# Patient Record
Sex: FEMALE | Race: WHITE | NOT HISPANIC OR LATINO | Employment: OTHER | URBAN - METROPOLITAN AREA
[De-identification: names, ages, dates, MRNs, and addresses within clinical notes are randomized per-mention and may not be internally consistent; named-entity substitution may affect disease eponyms.]

---

## 2023-04-13 PROBLEM — M54.50 ACUTE LEFT-SIDED LOW BACK PAIN WITHOUT SCIATICA: Status: ACTIVE | Noted: 2023-04-13

## 2023-04-13 PROBLEM — K21.9 GERD (GASTROESOPHAGEAL REFLUX DISEASE): Status: ACTIVE | Noted: 2023-04-13

## 2023-04-13 PROBLEM — E78.00 HYPERCHOLESTEROLEMIA: Status: ACTIVE | Noted: 2023-04-13

## 2023-04-13 PROBLEM — N18.31 CHRONIC RENAL FAILURE, STAGE 3A (MULTI): Status: ACTIVE | Noted: 2023-04-13

## 2023-04-13 PROBLEM — R79.89 LFT ELEVATION: Status: ACTIVE | Noted: 2023-04-13

## 2023-04-13 PROBLEM — R32 URINARY INCONTINENCE: Status: ACTIVE | Noted: 2023-04-13

## 2023-04-13 PROBLEM — R74.8 ELEVATED LIVER ENZYMES: Status: ACTIVE | Noted: 2023-04-13

## 2023-04-13 PROBLEM — M54.41 ACUTE MIDLINE LOW BACK PAIN WITH RIGHT-SIDED SCIATICA: Status: ACTIVE | Noted: 2023-04-13

## 2023-04-13 PROBLEM — F10.20 ALCOHOL DEPENDENCE, CONTINUOUS DRINKING BEHAVIOR (MULTI): Status: ACTIVE | Noted: 2023-04-13

## 2023-04-13 PROBLEM — F32.1 CURRENT MODERATE EPISODE OF MAJOR DEPRESSIVE DISORDER (MULTI): Status: ACTIVE | Noted: 2023-04-13

## 2023-04-13 PROBLEM — I10 HYPERTENSION: Status: ACTIVE | Noted: 2023-04-13

## 2023-04-13 PROBLEM — R74.01 ELEVATED ALT MEASUREMENT: Status: ACTIVE | Noted: 2023-04-13

## 2023-04-13 PROBLEM — M81.0 OSTEOPOROSIS: Status: ACTIVE | Noted: 2023-04-13

## 2023-04-13 PROBLEM — H52.13 MYOPIA OF BOTH EYES: Status: ACTIVE | Noted: 2023-04-13

## 2023-04-13 PROBLEM — R06.02 SHORTNESS OF BREATH ON EXERTION: Status: ACTIVE | Noted: 2023-04-13

## 2023-04-13 PROBLEM — J30.9 ALLERGIC RHINITIS: Status: ACTIVE | Noted: 2023-04-13

## 2023-04-13 PROBLEM — L40.9 PSORIASIS: Status: ACTIVE | Noted: 2023-04-13

## 2023-04-25 RX ORDER — KETOROLAC TROMETHAMINE 5 MG/ML
1 SOLUTION OPHTHALMIC
COMMUNITY
Start: 2022-09-28

## 2023-04-25 RX ORDER — METOPROLOL SUCCINATE 100 MG/1
100 TABLET, EXTENDED RELEASE ORAL DAILY
COMMUNITY
End: 2023-04-26 | Stop reason: SDUPTHER

## 2023-04-25 RX ORDER — ESCITALOPRAM OXALATE 10 MG/1
10 TABLET ORAL DAILY
COMMUNITY
End: 2023-04-26 | Stop reason: SDUPTHER

## 2023-04-25 RX ORDER — OXYBUTYNIN CHLORIDE 15 MG/1
15 TABLET, EXTENDED RELEASE ORAL DAILY
COMMUNITY
End: 2023-04-26 | Stop reason: SDUPTHER

## 2023-04-25 RX ORDER — MULTIVITAMIN
TABLET ORAL
COMMUNITY

## 2023-04-25 RX ORDER — CHLORHEXIDINE GLUCONATE ORAL RINSE 1.2 MG/ML
SOLUTION DENTAL
COMMUNITY
Start: 2022-06-27 | End: 2023-04-26 | Stop reason: ALTCHOICE

## 2023-04-25 RX ORDER — DENOSUMAB 60 MG/ML
60 INJECTION SUBCUTANEOUS
COMMUNITY
Start: 2019-09-24

## 2023-04-25 RX ORDER — ALUMINUM ZIRCONIUM OCTACHLOROHYDREX GLY 16 G/100G
GEL TOPICAL
COMMUNITY

## 2023-04-25 RX ORDER — NAPROXEN SODIUM 220 MG/1
81 TABLET, FILM COATED ORAL DAILY
COMMUNITY
Start: 2017-11-17

## 2023-04-25 RX ORDER — ATORVASTATIN CALCIUM 10 MG/1
10 TABLET, FILM COATED ORAL DAILY
COMMUNITY
End: 2023-04-26 | Stop reason: SDUPTHER

## 2023-04-25 RX ORDER — CLOBETASOL PROPIONATE 0.05 G/ML
SPRAY TOPICAL 2 TIMES DAILY
COMMUNITY
Start: 2012-05-16

## 2023-04-25 RX ORDER — ASCORBATE CALCIUM 500 MG
TABLET ORAL
COMMUNITY

## 2023-04-25 RX ORDER — PYRIDOXINE HCL (VITAMIN B6) 100 MG
100 TABLET ORAL
COMMUNITY

## 2023-04-25 RX ORDER — HALOBETASOL PROPIONATE 0.5 MG/G
OINTMENT TOPICAL 2 TIMES DAILY
COMMUNITY
Start: 2015-09-18

## 2023-04-26 ENCOUNTER — OFFICE VISIT (OUTPATIENT)
Dept: PRIMARY CARE | Facility: CLINIC | Age: 78
End: 2023-04-26
Payer: MEDICARE

## 2023-04-26 VITALS
HEART RATE: 64 BPM | BODY MASS INDEX: 28.62 KG/M2 | DIASTOLIC BLOOD PRESSURE: 78 MMHG | HEIGHT: 65 IN | WEIGHT: 171.8 LBS | OXYGEN SATURATION: 96 % | SYSTOLIC BLOOD PRESSURE: 138 MMHG

## 2023-04-26 DIAGNOSIS — H91.93 BILATERAL HEARING LOSS, UNSPECIFIED HEARING LOSS TYPE: ICD-10-CM

## 2023-04-26 DIAGNOSIS — E78.00 HYPERCHOLESTEROLEMIA: ICD-10-CM

## 2023-04-26 DIAGNOSIS — K21.9 GASTROESOPHAGEAL REFLUX DISEASE, UNSPECIFIED WHETHER ESOPHAGITIS PRESENT: ICD-10-CM

## 2023-04-26 DIAGNOSIS — Z00.00 ROUTINE GENERAL MEDICAL EXAMINATION AT HEALTH CARE FACILITY: Primary | ICD-10-CM

## 2023-04-26 DIAGNOSIS — Z00.00 WELLNESS EXAMINATION: ICD-10-CM

## 2023-04-26 DIAGNOSIS — R32 URINARY INCONTINENCE, UNSPECIFIED TYPE: ICD-10-CM

## 2023-04-26 DIAGNOSIS — Z13.39 ALCOHOL SCREENING: ICD-10-CM

## 2023-04-26 DIAGNOSIS — M81.0 AGE-RELATED OSTEOPOROSIS WITHOUT CURRENT PATHOLOGICAL FRACTURE: ICD-10-CM

## 2023-04-26 DIAGNOSIS — N18.31 CHRONIC RENAL FAILURE, STAGE 3A (MULTI): ICD-10-CM

## 2023-04-26 DIAGNOSIS — I10 HYPERTENSION, UNSPECIFIED TYPE: ICD-10-CM

## 2023-04-26 DIAGNOSIS — Z13.89 SCREENING FOR MULTIPLE CONDITIONS: ICD-10-CM

## 2023-04-26 DIAGNOSIS — F32.1 CURRENT MODERATE EPISODE OF MAJOR DEPRESSIVE DISORDER WITHOUT PRIOR EPISODE (MULTI): ICD-10-CM

## 2023-04-26 DIAGNOSIS — L40.9 PSORIASIS: ICD-10-CM

## 2023-04-26 DIAGNOSIS — Z12.31 SCREENING MAMMOGRAM, ENCOUNTER FOR: ICD-10-CM

## 2023-04-26 PROBLEM — R74.01 ELEVATED ALT MEASUREMENT: Status: RESOLVED | Noted: 2023-04-13 | Resolved: 2023-04-26

## 2023-04-26 PROBLEM — F10.20 ALCOHOL DEPENDENCE, CONTINUOUS DRINKING BEHAVIOR (MULTI): Status: RESOLVED | Noted: 2023-04-13 | Resolved: 2023-04-26

## 2023-04-26 PROBLEM — M54.50 ACUTE LEFT-SIDED LOW BACK PAIN WITHOUT SCIATICA: Status: RESOLVED | Noted: 2023-04-13 | Resolved: 2023-04-26

## 2023-04-26 PROBLEM — M54.41 ACUTE MIDLINE LOW BACK PAIN WITH RIGHT-SIDED SCIATICA: Status: RESOLVED | Noted: 2023-04-13 | Resolved: 2023-04-26

## 2023-04-26 PROBLEM — Z78.9 FULL CODE STATUS: Status: ACTIVE | Noted: 2023-04-26

## 2023-04-26 PROBLEM — R06.02 SHORTNESS OF BREATH ON EXERTION: Status: RESOLVED | Noted: 2023-04-13 | Resolved: 2023-04-26

## 2023-04-26 PROCEDURE — 3075F SYST BP GE 130 - 139MM HG: CPT | Performed by: INTERNAL MEDICINE

## 2023-04-26 PROCEDURE — G0439 PPPS, SUBSEQ VISIT: HCPCS | Performed by: INTERNAL MEDICINE

## 2023-04-26 PROCEDURE — 1036F TOBACCO NON-USER: CPT | Performed by: INTERNAL MEDICINE

## 2023-04-26 PROCEDURE — 3078F DIAST BP <80 MM HG: CPT | Performed by: INTERNAL MEDICINE

## 2023-04-26 PROCEDURE — 3079F DIAST BP 80-89 MM HG: CPT | Performed by: INTERNAL MEDICINE

## 2023-04-26 PROCEDURE — 3077F SYST BP >= 140 MM HG: CPT | Performed by: INTERNAL MEDICINE

## 2023-04-26 PROCEDURE — G0442 ANNUAL ALCOHOL SCREEN 15 MIN: HCPCS | Performed by: INTERNAL MEDICINE

## 2023-04-26 PROCEDURE — 1170F FXNL STATUS ASSESSED: CPT | Performed by: INTERNAL MEDICINE

## 2023-04-26 PROCEDURE — 99214 OFFICE O/P EST MOD 30 MIN: CPT | Performed by: INTERNAL MEDICINE

## 2023-04-26 PROCEDURE — 1157F ADVNC CARE PLAN IN RCRD: CPT | Performed by: INTERNAL MEDICINE

## 2023-04-26 PROCEDURE — 1160F RVW MEDS BY RX/DR IN RCRD: CPT | Performed by: INTERNAL MEDICINE

## 2023-04-26 PROCEDURE — 1159F MED LIST DOCD IN RCRD: CPT | Performed by: INTERNAL MEDICINE

## 2023-04-26 RX ORDER — VITS A,C,E/LUTEIN/MINERALS 300MCG-200
TABLET ORAL
COMMUNITY

## 2023-04-26 RX ORDER — ESCITALOPRAM OXALATE 10 MG/1
10 TABLET ORAL DAILY
Qty: 90 TABLET | Refills: 3 | Status: SHIPPED | OUTPATIENT
Start: 2023-04-26 | End: 2024-04-29 | Stop reason: SDUPTHER

## 2023-04-26 RX ORDER — METOPROLOL SUCCINATE 100 MG/1
100 TABLET, EXTENDED RELEASE ORAL DAILY
Qty: 90 TABLET | Refills: 3 | Status: SHIPPED | OUTPATIENT
Start: 2023-04-26 | End: 2023-05-23 | Stop reason: SDUPTHER

## 2023-04-26 RX ORDER — OXYBUTYNIN CHLORIDE 15 MG/1
15 TABLET, EXTENDED RELEASE ORAL DAILY
Qty: 90 TABLET | Refills: 3 | Status: SHIPPED | OUTPATIENT
Start: 2023-04-26 | End: 2024-04-29 | Stop reason: SDUPTHER

## 2023-04-26 RX ORDER — ATORVASTATIN CALCIUM 10 MG/1
10 TABLET, FILM COATED ORAL DAILY
Qty: 90 TABLET | Refills: 3 | Status: SHIPPED | OUTPATIENT
Start: 2023-04-26 | End: 2024-04-29 | Stop reason: SDUPTHER

## 2023-04-26 SDOH — ECONOMIC STABILITY: HOUSING INSECURITY: IN THE LAST 12 MONTHS, HOW MANY PLACES HAVE YOU LIVED?: 1

## 2023-04-26 SDOH — ECONOMIC STABILITY: FOOD INSECURITY: WITHIN THE PAST 12 MONTHS, YOU WORRIED THAT YOUR FOOD WOULD RUN OUT BEFORE YOU GOT MONEY TO BUY MORE.: NEVER TRUE

## 2023-04-26 SDOH — ECONOMIC STABILITY: HOUSING INSECURITY
IN THE LAST 12 MONTHS, WAS THERE A TIME WHEN YOU DID NOT HAVE A STEADY PLACE TO SLEEP OR SLEPT IN A SHELTER (INCLUDING NOW)?: NO

## 2023-04-26 SDOH — ECONOMIC STABILITY: TRANSPORTATION INSECURITY
IN THE PAST 12 MONTHS, HAS THE LACK OF TRANSPORTATION KEPT YOU FROM MEDICAL APPOINTMENTS OR FROM GETTING MEDICATIONS?: NO

## 2023-04-26 SDOH — HEALTH STABILITY: PHYSICAL HEALTH: ON AVERAGE, HOW MANY DAYS PER WEEK DO YOU ENGAGE IN MODERATE TO STRENUOUS EXERCISE (LIKE A BRISK WALK)?: 3 DAYS

## 2023-04-26 SDOH — ECONOMIC STABILITY: INCOME INSECURITY: IN THE LAST 12 MONTHS, WAS THERE A TIME WHEN YOU WERE NOT ABLE TO PAY THE MORTGAGE OR RENT ON TIME?: NO

## 2023-04-26 SDOH — ECONOMIC STABILITY: FOOD INSECURITY: WITHIN THE PAST 12 MONTHS, THE FOOD YOU BOUGHT JUST DIDN'T LAST AND YOU DIDN'T HAVE MONEY TO GET MORE.: NEVER TRUE

## 2023-04-26 SDOH — HEALTH STABILITY: PHYSICAL HEALTH: ON AVERAGE, HOW MANY MINUTES DO YOU ENGAGE IN EXERCISE AT THIS LEVEL?: 60 MIN

## 2023-04-26 SDOH — ECONOMIC STABILITY: TRANSPORTATION INSECURITY
IN THE PAST 12 MONTHS, HAS LACK OF TRANSPORTATION KEPT YOU FROM MEETINGS, WORK, OR FROM GETTING THINGS NEEDED FOR DAILY LIVING?: NO

## 2023-04-26 ASSESSMENT — SOCIAL DETERMINANTS OF HEALTH (SDOH)
WITHIN THE LAST YEAR, HAVE YOU BEEN KICKED, HIT, SLAPPED, OR OTHERWISE PHYSICALLY HURT BY YOUR PARTNER OR EX-PARTNER?: NO
HOW OFTEN DO YOU GET TOGETHER WITH FRIENDS OR RELATIVES?: MORE THAN THREE TIMES A WEEK
IN A TYPICAL WEEK, HOW MANY TIMES DO YOU TALK ON THE PHONE WITH FAMILY, FRIENDS, OR NEIGHBORS?: MORE THAN THREE TIMES A WEEK
HOW OFTEN DO YOU ATTEND CHURCH OR RELIGIOUS SERVICES?: NEVER
WITHIN THE LAST YEAR, HAVE YOU BEEN AFRAID OF YOUR PARTNER OR EX-PARTNER?: NO
WITHIN THE LAST YEAR, HAVE YOU BEEN HUMILIATED OR EMOTIONALLY ABUSED IN OTHER WAYS BY YOUR PARTNER OR EX-PARTNER?: NO
DO YOU BELONG TO ANY CLUBS OR ORGANIZATIONS SUCH AS CHURCH GROUPS UNIONS, FRATERNAL OR ATHLETIC GROUPS, OR SCHOOL GROUPS?: NO
HOW HARD IS IT FOR YOU TO PAY FOR THE VERY BASICS LIKE FOOD, HOUSING, MEDICAL CARE, AND HEATING?: NOT HARD AT ALL
HOW OFTEN DO YOU ATTENT MEETINGS OF THE CLUB OR ORGANIZATION YOU BELONG TO?: NEVER
WITHIN THE LAST YEAR, HAVE TO BEEN RAPED OR FORCED TO HAVE ANY KIND OF SEXUAL ACTIVITY BY YOUR PARTNER OR EX-PARTNER?: NO

## 2023-04-26 ASSESSMENT — ENCOUNTER SYMPTOMS
ARTHRALGIAS: 0
SHORTNESS OF BREATH: 0
WEAKNESS: 0
PALPITATIONS: 0
DIZZINESS: 0
HEADACHES: 0
VOMITING: 0
MYALGIAS: 0
FATIGUE: 0
SORE THROAT: 0
FREQUENCY: 0
CHILLS: 0
DYSURIA: 0
DIARRHEA: 0
HEMATURIA: 0
LIGHT-HEADEDNESS: 0
COUGH: 0
FEVER: 0
NAUSEA: 0
DIFFICULTY URINATING: 0
CONSTIPATION: 0

## 2023-04-26 ASSESSMENT — ACTIVITIES OF DAILY LIVING (ADL)
DOING_HOUSEWORK: INDEPENDENT
DRESSING YOURSELF: INDEPENDENT
MANAGING_FINANCES: INDEPENDENT
NEEDS ASSISTANCE WITH FOOD: INDEPENDENT
PREPARING MEALS: INDEPENDENT
TAKING_MEDICATION: INDEPENDENT
EATING: INDEPENDENT
TAKING MEDICATION: INDEPENDENT
HEARING - RIGHT EAR: FUNCTIONAL
BATHING: INDEPENDENT
MANAGING FINANCES: INDEPENDENT
JUDGMENT_ADEQUATE_SAFELY_COMPLETE_DAILY_ACTIVITIES: YES
GROOMING: INDEPENDENT
DOING HOUSEWORK: INDEPENDENT
USING TELEPHONE: INDEPENDENT
FEEDING YOURSELF: INDEPENDENT
ADEQUATE_TO_COMPLETE_ADL: YES
WALKS IN HOME: INDEPENDENT
PATIENT'S MEMORY ADEQUATE TO SAFELY COMPLETE DAILY ACTIVITIES?: YES
USING TRANSPORTATION: INDEPENDENT
HEARING - LEFT EAR: FUNCTIONAL
GROCERY_SHOPPING: INDEPENDENT
STIL DRIVING: YES
TOILETING: INDEPENDENT
PILL BOX USED: NO
GROCERY SHOPPING: INDEPENDENT

## 2023-04-26 ASSESSMENT — LIFESTYLE VARIABLES
HOW OFTEN DO YOU HAVE SIX OR MORE DRINKS ON ONE OCCASION: NEVER
SKIP TO QUESTIONS 9-10: 1
HOW MANY STANDARD DRINKS CONTAINING ALCOHOL DO YOU HAVE ON A TYPICAL DAY: 1 OR 2
HOW OFTEN DO YOU HAVE A DRINK CONTAINING ALCOHOL: 4 OR MORE TIMES A WEEK
AUDIT-C TOTAL SCORE: 4

## 2023-04-26 ASSESSMENT — PATIENT HEALTH QUESTIONNAIRE - PHQ9
1. LITTLE INTEREST OR PLEASURE IN DOING THINGS: NOT AT ALL
SUM OF ALL RESPONSES TO PHQ9 QUESTIONS 1 AND 2: 0
2. FEELING DOWN, DEPRESSED OR HOPELESS: NOT AT ALL

## 2023-04-26 ASSESSMENT — PAIN SCALES - GENERAL: PAINLEVEL: 0-NO PAIN

## 2023-04-26 NOTE — ASSESSMENT & PLAN NOTE
Patient complains of itching on her scalp which I believe is psoriasis she has dry flaking skin and plaques in the scalp line.  I recommended steroid shampoo but she would like to see dermatology and has made an appointment with [facility].  I encouraged her to get his skin cancer screening as well as since she will be there

## 2023-04-26 NOTE — PROGRESS NOTES
"Subjective   Reason for Visit: Shona Garduno is an 78 y.o. female here for a Medicare Wellness visit.     Past Medical, Surgical, and Family History reviewed and updated in chart.    Reviewed all medications by prescribing practitioner or clinical pharmacist (such as prescriptions, OTCs, herbal therapies and supplements) and documented in the medical record.    Patient is here for an annual wellness visit.  She also has some questions about COVID as she will be traveling soon.  She has not had a booster vaccine since November 2021 and I did recommend a booster prior to her traveling since she will be on a cruise ship.  Patient also admits she has not been checking her blood pressure recently at home but does have a cough    Both the patient and I noted she is slightly hard of hearing and did recommend hearing testing.  She was going to go to [facility] wants to know if that is an acceptable place to go for hearing aids and I told her that it was.        Patient Care Team:  Claudine Perez MD as PCP - General  Claudine Perez MD as PCP - [employer] PCP     Review of Systems   Constitutional:  Negative for chills, fatigue and fever.   HENT:  Positive for hearing loss. Negative for sore throat.    Eyes:  Negative for visual disturbance.   Respiratory:  Negative for cough and shortness of breath.    Cardiovascular:  Negative for chest pain, palpitations and leg swelling.   Gastrointestinal:  Negative for constipation, diarrhea, nausea and vomiting.   Genitourinary:  Negative for difficulty urinating, dysuria, frequency, hematuria and urgency.   Musculoskeletal:  Negative for arthralgias and myalgias.   Skin:  Negative for rash.   Neurological:  Negative for dizziness, syncope, weakness, light-headedness and headaches.       Objective   Vitals:  /84 (BP Location: Left arm, Patient Position: Sitting)   Pulse 64   Ht 1.638 m (5' 4.5\")   Wt 77.9 kg (171 lb 12.8 oz)   SpO2 96%   BMI 29.03 kg/m²   "     Physical Exam  Constitutional:       Appearance: Normal appearance.   HENT:      Head: Normocephalic and atraumatic.      Nose: Nose normal.   Eyes:      Extraocular Movements: Extraocular movements intact.      Pupils: Pupils are equal, round, and reactive to light.   Cardiovascular:      Rate and Rhythm: Normal rate and regular rhythm.   Pulmonary:      Breath sounds: Normal breath sounds.   Abdominal:      General: Abdomen is flat. Bowel sounds are normal.      Palpations: Abdomen is soft.   Musculoskeletal:      Right lower leg: No edema.      Left lower leg: No edema.   Neurological:      Mental Status: She is alert.         Assessment/Plan   Problem List Items Addressed This Visit          Circulatory    Hypertension    Current Assessment & Plan     Pretension with poor control today.  Even after recheck it was 138/78.  Patient will start checking her blood pressure at home and if it continuously stays over 130 she is to call us as we may need to adjust her medications         Relevant Medications    metoprolol succinate XL (Toprol-XL) 100 mg 24 hr tablet    Other Relevant Orders    CBC    Comprehensive Metabolic Panel    Lipid Panel    CBC    Comprehensive Metabolic Panel    TSH with reflex to Free T4 if abnormal    Vitamin D 1,25 Dihydroxy       Digestive    GERD (gastroesophageal reflux disease)    Relevant Orders    CBC    Lipid Panel    CBC    Comprehensive Metabolic Panel    TSH with reflex to Free T4 if abnormal    Vitamin D 1,25 Dihydroxy       Genitourinary    Chronic renal failure, stage 3a    Current Assessment & Plan     Chronic renal insufficiency, patient was reminded to drink more fluids and avoid NSAIDs and this will be monitored.  She is due for annual blood work in May and was given a requisition today         Relevant Orders    CBC    Comprehensive Metabolic Panel    Vitamin D 1,25 Dihydroxy    Lipid Panel    CBC    Comprehensive Metabolic Panel    TSH with reflex to Free T4 if abnormal     Vitamin D 1,25 Dihydroxy    Urinary incontinence    Current Assessment & Plan     Patient is stable with the oxybutynin.         Relevant Medications    oxybutynin XL (Ditropan-XL) 15 mg 24 hr tablet       Musculoskeletal    Osteoporosis    Current Assessment & Plan     Patient is on Prolia however injections are on hold as she has been having dental work with posts put into her jaw.  Once it is completed we will go back to the Prolia injections.         Relevant Orders    XR DEXA bone density       Immune    Psoriasis    Current Assessment & Plan     Patient complains of itching on her scalp which I believe is psoriasis she has dry flaking skin and plaques in the scalp line.  I recommended steroid shampoo but she would like to see dermatology and has made an appointment with [facility].  I encouraged her to get his skin cancer screening as well as since she will be there            Other    Current moderate episode of major depressive disorder (CMS/HCC)    Current Assessment & Plan     Patient is stable on the ESCitalopram and was given a refill         Relevant Medications    escitalopram (Lexapro) 10 mg tablet    Hypercholesterolemia    Current Assessment & Plan     Refill atorvastatin and check lipid profile and liver enzymes as both labs are due         Relevant Medications    atorvastatin (Lipitor) 10 mg tablet    Other Relevant Orders    Lipid Panel    TSH with reflex to Free T4 if abnormal    Lipid Panel    CBC    Comprehensive Metabolic Panel    TSH with reflex to Free T4 if abnormal    Vitamin D 1,25 Dihydroxy    Wellness examination    Current Assessment & Plan     Annual wellness visit was completed today.         Alcohol screening    Current Assessment & Plan     Patient used to drink 2-3 times every night but is cut back to only a few times per month and less than once a week now.  Alcohol screening is therefore negative and she has done a great job cutting back on the consumption rate.          Screening for multiple conditions    Bilateral hearing loss    Current Assessment & Plan     Patient wanted to know if it was okay to go to [facility] and I told her it was she will get hearing testing there and hearing aids if necessary.          Other Visit Diagnoses       Routine general medical examination at health care facility    -  Primary    Relevant Orders    Lipid Panel    CBC    Comprehensive Metabolic Panel    TSH with reflex to Free T4 if abnormal    Vitamin D 1,25 Dihydroxy    Screening mammogram, encounter for        Relevant Orders    BI mammo bilateral screening tomosynthesis

## 2023-04-26 NOTE — ASSESSMENT & PLAN NOTE
Patient is on Prolia however injections are on hold as she has been having dental work with posts put into her jaw.  Once it is completed we will go back to the Prolia injections.

## 2023-04-26 NOTE — ASSESSMENT & PLAN NOTE
Chronic renal insufficiency, patient was reminded to drink more fluids and avoid NSAIDs and this will be monitored.  She is due for annual blood work in May and was given a requisition today

## 2023-04-26 NOTE — ASSESSMENT & PLAN NOTE
Patient wanted to know if it was okay to go to [facility] and I told her it was she will get hearing testing there and hearing aids if necessary.

## 2023-04-26 NOTE — ASSESSMENT & PLAN NOTE
Pretension with poor control today.  Even after recheck it was 138/78.  Patient will start checking her blood pressure at home and if it continuously stays over 130 she is to call us as we may need to adjust her medications

## 2023-04-26 NOTE — ASSESSMENT & PLAN NOTE
CODE STATUS was discussed and patient would like to be a full code.  She understands this may include CPR, cardioversion intubation and ventilation to stabilize her.

## 2023-04-26 NOTE — ASSESSMENT & PLAN NOTE
Patient used to drink 2-3 times every night but is cut back to only a few times per month and less than once a week now.  Alcohol screening is therefore negative and she has done a great job cutting back on the consumption rate.

## 2023-04-26 NOTE — PROGRESS NOTES
"Subjective   Reason for Visit: Shona Garduno is an 78 y.o. female here for a Medicare Wellness visit.               HPI    Patient Care Team:  Claudine Perez MD as PCP - General  Claudine Perez MD as PCP - [employer] PCP     Review of Systems    Objective   Vitals:  /84 (BP Location: Left arm, Patient Position: Sitting)   Pulse 64   Ht 1.638 m (5' 4.5\")   Wt 77.9 kg (171 lb 12.8 oz)   SpO2 96%   BMI 29.03 kg/m²       Physical Exam    Assessment/Plan   Problem List Items Addressed This Visit        Circulatory    Hypertension    Relevant Orders    CBC    Comprehensive Metabolic Panel       Digestive    GERD (gastroesophageal reflux disease)    Relevant Orders    CBC       Genitourinary    Chronic renal failure, stage 3a    Relevant Orders    CBC    Comprehensive Metabolic Panel    Vitamin D 1,25 Dihydroxy       Other    Hypercholesterolemia    Relevant Orders    Lipid Panel    TSH with reflex to Free T4 if abnormal   Other Visit Diagnoses     Routine general medical examination at health care facility    -  Primary             "

## 2023-04-26 NOTE — PATIENT INSTRUCTIONS
Follow up with Dr Michelle for colonoscopy    Get fasting labs    Get DXA after 6/15/23  Get mammo after 8/16/23    Go to [facility] for hearing test and possible aides    Please check Blood Pressure at home and call if >130/80    Try [facility] in [place]    Follow up Dr Perez in 4 months with 30 m in

## 2023-05-22 ENCOUNTER — TELEPHONE (OUTPATIENT)
Dept: PRIMARY CARE | Facility: CLINIC | Age: 78
End: 2023-05-22
Payer: MEDICARE

## 2023-05-22 NOTE — TELEPHONE ENCOUNTER
Pt calls in. She reports that she was told to call you with her BP if it is over 130/80 and she reports that it has been consistently over 130/80 and she wanted to see what the plan is?

## 2023-05-23 DIAGNOSIS — I10 HYPERTENSION, UNSPECIFIED TYPE: ICD-10-CM

## 2023-05-23 RX ORDER — METOPROLOL SUCCINATE 100 MG/1
100 TABLET, EXTENDED RELEASE ORAL 2 TIMES DAILY
Qty: 180 TABLET | Refills: 3 | Status: SHIPPED | OUTPATIENT
Start: 2023-05-23 | End: 2023-06-26 | Stop reason: SDUPTHER

## 2023-06-26 ENCOUNTER — OFFICE VISIT (OUTPATIENT)
Dept: PRIMARY CARE | Facility: CLINIC | Age: 78
End: 2023-06-26
Payer: MEDICARE

## 2023-06-26 VITALS
SYSTOLIC BLOOD PRESSURE: 143 MMHG | HEIGHT: 65 IN | BODY MASS INDEX: 28.72 KG/M2 | DIASTOLIC BLOOD PRESSURE: 78 MMHG | HEART RATE: 60 BPM | OXYGEN SATURATION: 95 % | WEIGHT: 172.4 LBS

## 2023-06-26 DIAGNOSIS — I10 HYPERTENSION, UNSPECIFIED TYPE: ICD-10-CM

## 2023-06-26 DIAGNOSIS — H91.93 BILATERAL HEARING LOSS, UNSPECIFIED HEARING LOSS TYPE: Primary | ICD-10-CM

## 2023-06-26 DIAGNOSIS — M79.661 RIGHT CALF PAIN: ICD-10-CM

## 2023-06-26 PROCEDURE — 1160F RVW MEDS BY RX/DR IN RCRD: CPT | Performed by: INTERNAL MEDICINE

## 2023-06-26 PROCEDURE — 1157F ADVNC CARE PLAN IN RCRD: CPT | Performed by: INTERNAL MEDICINE

## 2023-06-26 PROCEDURE — 3077F SYST BP >= 140 MM HG: CPT | Performed by: INTERNAL MEDICINE

## 2023-06-26 PROCEDURE — 1036F TOBACCO NON-USER: CPT | Performed by: INTERNAL MEDICINE

## 2023-06-26 PROCEDURE — 3078F DIAST BP <80 MM HG: CPT | Performed by: INTERNAL MEDICINE

## 2023-06-26 PROCEDURE — 99214 OFFICE O/P EST MOD 30 MIN: CPT | Performed by: INTERNAL MEDICINE

## 2023-06-26 PROCEDURE — 1159F MED LIST DOCD IN RCRD: CPT | Performed by: INTERNAL MEDICINE

## 2023-06-26 RX ORDER — LISINOPRIL 5 MG/1
5 TABLET ORAL DAILY
Qty: 30 TABLET | Refills: 1 | Status: SHIPPED | OUTPATIENT
Start: 2023-06-26 | End: 2023-07-24 | Stop reason: SINTOL

## 2023-06-26 RX ORDER — METOPROLOL SUCCINATE 100 MG/1
100 TABLET, EXTENDED RELEASE ORAL 2 TIMES DAILY
Qty: 180 TABLET | Refills: 3 | Status: SHIPPED | OUTPATIENT
Start: 2023-06-26 | End: 2023-07-24 | Stop reason: ALTCHOICE

## 2023-06-26 ASSESSMENT — ENCOUNTER SYMPTOMS
COUGH: 0
FATIGUE: 0
HEADACHES: 0
SHORTNESS OF BREATH: 0
LIGHT-HEADEDNESS: 0
DYSURIA: 0
PALPITATIONS: 0
VOMITING: 0
CHILLS: 0
DIZZINESS: 0
ARTHRALGIAS: 0
DIARRHEA: 0
NAUSEA: 0
MYALGIAS: 0
HEMATURIA: 0
SORE THROAT: 0
DIFFICULTY URINATING: 0
FEVER: 0
FREQUENCY: 0
CONSTIPATION: 0
WEAKNESS: 0

## 2023-06-26 ASSESSMENT — PAIN SCALES - GENERAL: PAINLEVEL: 4

## 2023-06-26 ASSESSMENT — PATIENT HEALTH QUESTIONNAIRE - PHQ9
2. FEELING DOWN, DEPRESSED OR HOPELESS: NOT AT ALL
1. LITTLE INTEREST OR PLEASURE IN DOING THINGS: NOT AT ALL
SUM OF ALL RESPONSES TO PHQ9 QUESTIONS 1 AND 2: 0

## 2023-06-26 NOTE — ASSESSMENT & PLAN NOTE
Calf pain is not clear as to the cause but she is wearing flip-flops so I encouraged her to wear sandals with a back strap in her tennis shoes, take 1 Aleve twice a day for the next week if symptoms or not improving she is to let us know.  She has some left upper back and flank pain as well but this right calf pain I think is unrelated.

## 2023-06-26 NOTE — PROGRESS NOTES
Subjective   Patient ID: Shona Garduno is a 78 y.o. female who presents for follow-up for HTN management.  BN    Patient is here today for 1 month follow-up on hypertension.  At the beginning of May the patient increased her metoprolol succinate 100 mg daily to 2 tablets and brings in several blood pressure readings from home.  Her blood pressure readings from home are all consistently elevated.  Blood pressures are as follows  Sosa 15 161/82 pulse 68  June 16 138/77 pulse 55  June 16 111/91 pulse of 62 at midnight  June 17 blood pressure is 138/77 pulse is 61 at 4:20 PM  June 17, 2023 blood pressure is 121/84 pulse 71 at 10:50 PM  June 25 blood pressure is 145/73 pulse of 68 at 11:15 PM    Patient's other complaint is that she has pain in her right leg.  She thinks the pain has been there since before her vacation.  She has noticed that the left calf is slightly swollen compared to the right.  The pain is throughout the entire leg and the lower calf and wakes her up at night.  Walking makes it feel slightly better and if she takes an Aleve it significantly reduces the pain.  She does not recall any trauma.  There is no bruising or other abnormalities noted.        Review of Systems   Constitutional:  Negative for chills, fatigue and fever.   HENT:  Negative for sore throat.    Eyes:  Negative for visual disturbance.   Respiratory:  Negative for cough and shortness of breath.    Cardiovascular:  Negative for chest pain, palpitations and leg swelling.   Gastrointestinal:  Negative for constipation, diarrhea, nausea and vomiting.   Genitourinary:  Negative for difficulty urinating, dysuria, frequency, hematuria and urgency.   Musculoskeletal:  Negative for arthralgias and myalgias.        Calf pain for about the last month.   Skin:  Negative for rash.   Neurological:  Negative for dizziness, syncope, weakness, light-headedness and headaches.       Objective   Medication Documentation Review Audit       Reviewed  by Claudine Perez MD (Physician) on 06/26/23 at 1123      Medication Order Taking? Sig Documenting Provider Last Dose Status   aspirin 81 mg chewable tablet [id number]  Chew 1 tablet (81 mg) once daily. Historical Provider, MD  Active   atorvastatin (Lipitor) 10 mg tablet [id number]  Take 1 tablet (10 mg) by mouth once daily. Claudine Perez MD  Active   Bifidobacterium infantis (Align) 4 mg capsule [id number]  Take by mouth. Historical Provider, MD  Active   CALCIUM CITRATE-VITAMIN D3 ORAL [id number]  Take by mouth. Historical Provider, MD  Active   clobetasoL 0.05 % external spray [id number]  twice a day. Historical Provider, MD  Active   denosumab (Prolia) 60 mg/mL syringe [id number]  Inject 1 mL (60 mg) under the skin. Historical Provider, MD  Active   escitalopram (Lexapro) 10 mg tablet [id number]  Take 1 tablet (10 mg) by mouth once daily. Claudine Perez MD  Active   halobetasol (UltraVATE) 0.05 % ointment [id number]  twice a day. Historical Provider, MD  Active   ketorolac (Acular) 0.5 % ophthalmic solution [id number]  Administer 1 drop into the left eye. Historical Provider, MD  Active   metoprolol succinate XL (Toprol-XL) 100 mg 24 hr tablet [id number]  Take 1 tablet (100 mg) by mouth 2 times a day. Claudine Perez MD  Active   multivitamin tablet [id number]  Take by mouth. Historical Provider, MD  Active   oxybutynin XL (Ditropan-XL) 15 mg 24 hr tablet [id number]  Take 1 tablet (15 mg) by mouth once daily. Claudine Perez MD  Active   pyridoxine (Vitamin B-6) 100 mg tablet [id number]  Take 1 tablet (100 mg) by mouth. Historical Provider, MD  Active   vit A,C and E-lutein-minerals (Ocuvite with Lutein) 300 mcg-200 mg-27 mg-2 mg tablet [id number]  Take by mouth. Historical Provider, MD  Active   vitamin E acid succinate (vitamin E succinate) 67 mg (100 unit) tablet [id number]  Take by mouth once daily. Historical Provider, MD  Active                  Physical Exam  Constitutional:       Appearance: Normal appearance.   HENT:      Head:  "Normocephalic and atraumatic.      Nose: Nose normal.   Eyes:      Extraocular Movements: Extraocular movements intact.      Pupils: Pupils are equal, round, and reactive to light.   Cardiovascular:      Rate and Rhythm: Normal rate and regular rhythm.   Pulmonary:      Breath sounds: Normal breath sounds.   Abdominal:      General: Abdomen is flat. Bowel sounds are normal.      Palpations: Abdomen is soft.   Musculoskeletal:      Right lower leg: No edema.      Left lower leg: No edema.   Neurological:      Mental Status: She is alert.      Comments: Lateral knee and ankle jerk reflexes are symmetric.  There are no focal deficits in either lower extremity  Left calf is slightly enlarged compared to the right but only by less than a centimeter.     /78 (BP Location: Left arm, Patient Position: Sitting)   Pulse 60   Ht 1.638 m (5' 4.5\")   Wt 78.2 kg (172 lb 6.4 oz)   SpO2 95%   BMI 29.14 kg/m²       Assessment/Plan   Problem List Items Addressed This Visit       Hypertension     1.  Hypertension with poor control, patient will stay on metoprolol succinate 200 mg at bedtime and to this I will add lisinopril 5 mg in the morning.  I will reassess the patient and she will follow-up with me in 1 month    Patient is overdue for blood work so I asked her to wait at least 2 to 3 weeks before she gets it so I can see if the lisinopril is doing anything to affect her potassium and or renal function.         Relevant Medications    metoprolol succinate XL (Toprol-XL) 100 mg 24 hr tablet    lisinopril 5 mg tablet    Bilateral hearing loss - Primary     Patient complained of hearing loss so she went to [facility] where she got the hearing test.  Although she has mild hearing loss it is not severe enough to warrant hearing aids at this time.         Right calf pain     Calf pain is not clear as to the cause but she is wearing flip-flops so I encouraged her to wear sandals with a back strap in her tennis shoes, take 1 Aleve " twice a day for the next week if symptoms or not improving she is to let us know.  She has some left upper back and flank pain as well but this right calf pain I think is unrelated.            Follow-up with me in 1 month for hypertension  Patient will try Aleve and get back to me in a month about the right calf pain.      It has been a pleasure seeing you.

## 2023-06-26 NOTE — ASSESSMENT & PLAN NOTE
Patient complained of hearing loss so she went to [facility] where she got the hearing test.  Although she has mild hearing loss it is not severe enough to warrant hearing aids at this time.

## 2023-06-26 NOTE — ASSESSMENT & PLAN NOTE
1.  Hypertension with poor control, patient will stay on metoprolol succinate 200 mg at bedtime and to this I will add lisinopril 5 mg in the morning.  I will reassess the patient and she will follow-up with me in 1 month    Patient is overdue for blood work so I asked her to wait at least 2 to 3 weeks before she gets it so I can see if the lisinopril is doing anything to affect her potassium and or renal function.

## 2023-07-18 ENCOUNTER — LAB (OUTPATIENT)
Dept: LAB | Facility: LAB | Age: 78
End: 2023-07-18
Payer: MEDICARE

## 2023-07-18 DIAGNOSIS — E78.00 HYPERCHOLESTEROLEMIA: ICD-10-CM

## 2023-07-18 DIAGNOSIS — K21.9 GASTROESOPHAGEAL REFLUX DISEASE, UNSPECIFIED WHETHER ESOPHAGITIS PRESENT: ICD-10-CM

## 2023-07-18 DIAGNOSIS — I10 HYPERTENSION, UNSPECIFIED TYPE: ICD-10-CM

## 2023-07-18 DIAGNOSIS — Z00.00 ROUTINE GENERAL MEDICAL EXAMINATION AT HEALTH CARE FACILITY: ICD-10-CM

## 2023-07-18 DIAGNOSIS — N18.31 CHRONIC RENAL FAILURE, STAGE 3A (MULTI): ICD-10-CM

## 2023-07-18 LAB
ALANINE AMINOTRANSFERASE (SGPT) (U/L) IN SER/PLAS: 21 U/L (ref 7–45)
ALBUMIN (G/DL) IN SER/PLAS: 4.3 G/DL (ref 3.4–5)
ALKALINE PHOSPHATASE (U/L) IN SER/PLAS: 163 U/L (ref 33–136)
ANION GAP IN SER/PLAS: 12 MMOL/L (ref 10–20)
ASPARTATE AMINOTRANSFERASE (SGOT) (U/L) IN SER/PLAS: 26 U/L (ref 9–39)
BILIRUBIN TOTAL (MG/DL) IN SER/PLAS: 0.8 MG/DL (ref 0–1.2)
CALCIUM (MG/DL) IN SER/PLAS: 10 MG/DL (ref 8.6–10.6)
CARBON DIOXIDE, TOTAL (MMOL/L) IN SER/PLAS: 27 MMOL/L (ref 21–32)
CHLORIDE (MMOL/L) IN SER/PLAS: 105 MMOL/L (ref 98–107)
CHOLESTEROL (MG/DL) IN SER/PLAS: 179 MG/DL (ref 0–199)
CHOLESTEROL IN HDL (MG/DL) IN SER/PLAS: 77 MG/DL
CHOLESTEROL/HDL RATIO: 2.3
CREATININE (MG/DL) IN SER/PLAS: 0.94 MG/DL (ref 0.5–1.05)
ERYTHROCYTE DISTRIBUTION WIDTH (RATIO) BY AUTOMATED COUNT: 13.5 % (ref 11.5–14.5)
ERYTHROCYTE MEAN CORPUSCULAR HEMOGLOBIN CONCENTRATION (G/DL) BY AUTOMATED: 32.7 G/DL (ref 32–36)
ERYTHROCYTE MEAN CORPUSCULAR VOLUME (FL) BY AUTOMATED COUNT: 99 FL (ref 80–100)
ERYTHROCYTES (10*6/UL) IN BLOOD BY AUTOMATED COUNT: 4.73 X10E12/L (ref 4–5.2)
GFR FEMALE: 63 ML/MIN/1.73M2
GLUCOSE (MG/DL) IN SER/PLAS: 113 MG/DL (ref 74–99)
HEMATOCRIT (%) IN BLOOD BY AUTOMATED COUNT: 46.8 % (ref 36–46)
HEMOGLOBIN (G/DL) IN BLOOD: 15.3 G/DL (ref 12–16)
LDL: 81 MG/DL (ref 0–99)
LEUKOCYTES (10*3/UL) IN BLOOD BY AUTOMATED COUNT: 6.1 X10E9/L (ref 4.4–11.3)
NRBC (PER 100 WBCS) BY AUTOMATED COUNT: 0 /100 WBC (ref 0–0)
PLATELETS (10*3/UL) IN BLOOD AUTOMATED COUNT: 196 X10E9/L (ref 150–450)
POTASSIUM (MMOL/L) IN SER/PLAS: 4.2 MMOL/L (ref 3.5–5.3)
PROTEIN TOTAL: 6.9 G/DL (ref 6.4–8.2)
SODIUM (MMOL/L) IN SER/PLAS: 140 MMOL/L (ref 136–145)
THYROTROPIN (MIU/L) IN SER/PLAS BY DETECTION LIMIT <= 0.05 MIU/L: 1.19 MIU/L (ref 0.44–3.98)
TRIGLYCERIDE (MG/DL) IN SER/PLAS: 103 MG/DL (ref 0–149)
UREA NITROGEN (MG/DL) IN SER/PLAS: 15 MG/DL (ref 6–23)
VLDL: 21 MG/DL (ref 0–40)

## 2023-07-18 PROCEDURE — 84443 ASSAY THYROID STIM HORMONE: CPT

## 2023-07-18 PROCEDURE — 85027 COMPLETE CBC AUTOMATED: CPT

## 2023-07-18 PROCEDURE — 80053 COMPREHEN METABOLIC PANEL: CPT

## 2023-07-18 PROCEDURE — 82652 VIT D 1 25-DIHYDROXY: CPT

## 2023-07-18 PROCEDURE — 80061 LIPID PANEL: CPT

## 2023-07-18 PROCEDURE — 36415 COLL VENOUS BLD VENIPUNCTURE: CPT

## 2023-07-20 LAB — VITAMIN D 1,25-DIHYDROXY: 51.1 PG/ML (ref 19.9–79.3)

## 2023-07-24 ENCOUNTER — LAB (OUTPATIENT)
Dept: LAB | Facility: LAB | Age: 78
End: 2023-07-24
Payer: MEDICARE

## 2023-07-24 ENCOUNTER — OFFICE VISIT (OUTPATIENT)
Dept: PRIMARY CARE | Facility: CLINIC | Age: 78
End: 2023-07-24
Payer: MEDICARE

## 2023-07-24 VITALS
WEIGHT: 169.4 LBS | BODY MASS INDEX: 28.22 KG/M2 | HEIGHT: 65 IN | HEART RATE: 89 BPM | SYSTOLIC BLOOD PRESSURE: 139 MMHG | OXYGEN SATURATION: 94 % | DIASTOLIC BLOOD PRESSURE: 80 MMHG

## 2023-07-24 DIAGNOSIS — R05.1 ACUTE COUGH: ICD-10-CM

## 2023-07-24 DIAGNOSIS — R74.8 ELEVATED ALKALINE PHOSPHATASE LEVEL: ICD-10-CM

## 2023-07-24 DIAGNOSIS — I10 HYPERTENSION, UNSPECIFIED TYPE: ICD-10-CM

## 2023-07-24 DIAGNOSIS — N18.31 CHRONIC RENAL FAILURE, STAGE 3A (MULTI): ICD-10-CM

## 2023-07-24 DIAGNOSIS — K21.9 GASTROESOPHAGEAL REFLUX DISEASE, UNSPECIFIED WHETHER ESOPHAGITIS PRESENT: ICD-10-CM

## 2023-07-24 DIAGNOSIS — I10 PRIMARY HYPERTENSION: ICD-10-CM

## 2023-07-24 DIAGNOSIS — E78.00 HYPERCHOLESTEROLEMIA: ICD-10-CM

## 2023-07-24 DIAGNOSIS — R74.8 ELEVATED ALKALINE PHOSPHATASE LEVEL: Primary | ICD-10-CM

## 2023-07-24 PROCEDURE — 36415 COLL VENOUS BLD VENIPUNCTURE: CPT

## 2023-07-24 PROCEDURE — 82652 VIT D 1 25-DIHYDROXY: CPT

## 2023-07-24 PROCEDURE — 3075F SYST BP GE 130 - 139MM HG: CPT | Performed by: INTERNAL MEDICINE

## 2023-07-24 PROCEDURE — 80061 LIPID PANEL: CPT

## 2023-07-24 PROCEDURE — 99214 OFFICE O/P EST MOD 30 MIN: CPT | Performed by: INTERNAL MEDICINE

## 2023-07-24 PROCEDURE — 1036F TOBACCO NON-USER: CPT | Performed by: INTERNAL MEDICINE

## 2023-07-24 PROCEDURE — 85027 COMPLETE CBC AUTOMATED: CPT

## 2023-07-24 PROCEDURE — 84075 ASSAY ALKALINE PHOSPHATASE: CPT

## 2023-07-24 PROCEDURE — 1160F RVW MEDS BY RX/DR IN RCRD: CPT | Performed by: INTERNAL MEDICINE

## 2023-07-24 PROCEDURE — 84443 ASSAY THYROID STIM HORMONE: CPT

## 2023-07-24 PROCEDURE — 3079F DIAST BP 80-89 MM HG: CPT | Performed by: INTERNAL MEDICINE

## 2023-07-24 PROCEDURE — 1159F MED LIST DOCD IN RCRD: CPT | Performed by: INTERNAL MEDICINE

## 2023-07-24 PROCEDURE — 1126F AMNT PAIN NOTED NONE PRSNT: CPT | Performed by: INTERNAL MEDICINE

## 2023-07-24 PROCEDURE — 80053 COMPREHEN METABOLIC PANEL: CPT

## 2023-07-24 PROCEDURE — 84080 ASSAY ALKALINE PHOSPHATASES: CPT

## 2023-07-24 PROCEDURE — 1157F ADVNC CARE PLAN IN RCRD: CPT | Performed by: INTERNAL MEDICINE

## 2023-07-24 RX ORDER — VALSARTAN 80 MG/1
80 TABLET ORAL DAILY
Qty: 30 TABLET | Refills: 2 | Status: SHIPPED | OUTPATIENT
Start: 2023-07-24 | End: 2023-10-23 | Stop reason: SDUPTHER

## 2023-07-24 RX ORDER — PRENATAL VIT 75/IRON/FOLIC/OM3 28-800-223
COMBINATION PACKAGE (EA) ORAL
COMMUNITY

## 2023-07-24 RX ORDER — CHLORHEXIDINE GLUCONATE ORAL RINSE 1.2 MG/ML
SOLUTION DENTAL
COMMUNITY
Start: 2023-07-17

## 2023-07-24 RX ORDER — AZITHROMYCIN 250 MG/1
250 TABLET, FILM COATED ORAL
COMMUNITY
Start: 2023-07-17

## 2023-07-24 RX ORDER — GLUCOSAMINE/CHONDR SU A SOD 750-600 MG
TABLET ORAL
COMMUNITY

## 2023-07-24 ASSESSMENT — ENCOUNTER SYMPTOMS
COUGH: 1
FREQUENCY: 0
DIARRHEA: 0
FEVER: 0
WEAKNESS: 0
LIGHT-HEADEDNESS: 0
HEMATURIA: 0
FATIGUE: 1
MYALGIAS: 0
SORE THROAT: 0
DIFFICULTY URINATING: 0
DIZZINESS: 0
SHORTNESS OF BREATH: 0
PALPITATIONS: 0
ARTHRALGIAS: 0
VOMITING: 0
DYSURIA: 0
CONSTIPATION: 0
CHILLS: 0
NAUSEA: 0
HEADACHES: 0

## 2023-07-24 ASSESSMENT — PATIENT HEALTH QUESTIONNAIRE - PHQ9
2. FEELING DOWN, DEPRESSED OR HOPELESS: SEVERAL DAYS
SUM OF ALL RESPONSES TO PHQ9 QUESTIONS 1 AND 2: 1
10. IF YOU CHECKED OFF ANY PROBLEMS, HOW DIFFICULT HAVE THESE PROBLEMS MADE IT FOR YOU TO DO YOUR WORK, TAKE CARE OF THINGS AT HOME, OR GET ALONG WITH OTHER PEOPLE: VERY DIFFICULT
1. LITTLE INTEREST OR PLEASURE IN DOING THINGS: NOT AT ALL

## 2023-07-24 ASSESSMENT — PAIN SCALES - GENERAL: PAINLEVEL: 0-NO PAIN

## 2023-07-24 NOTE — PROGRESS NOTES
Subjective   Patient ID: Shona Garduno is a 78 y.o. female who presents for 1 month re for HTN management.  BN      Patient is here for 1 month follow-up on hypertension.  At her last appointment the patient was started on lisinopril 5 mg daily however she complained of severe cough.  She spoke to her daughter-in-law who is a cardiologist who made her stop the lisinopril but also stopped her metoprolol because of severe fatigue.  Patient was weaned off of the metoprolol but clearly her blood pressure has gone up.  Home readings the last few days were 144/82 with a pulse of 88  181/95 with a pulse of 82  140/84 with a pulse of 103  And 150/93 with a pulse of 97.  Clearly the blood pressure has gone up without the lisinopril and metoprolol.  At this time she is more than willing to start something new and I have recommended an 8 angiotensin receptor blocker such as losartan.        Review of Systems   Constitutional:  Positive for fatigue. Negative for chills and fever.        Patient was complaining of general fatigue and a cough both have improved since she stopped the lisinopril and metoprolol.   HENT:  Negative for sore throat.    Eyes:  Negative for visual disturbance.   Respiratory:  Positive for cough. Negative for shortness of breath.    Cardiovascular:  Negative for chest pain, palpitations and leg swelling.   Gastrointestinal:  Negative for constipation, diarrhea, nausea and vomiting.   Genitourinary:  Negative for difficulty urinating, dysuria, frequency, hematuria and urgency.   Musculoskeletal:  Negative for arthralgias and myalgias.   Skin:  Negative for rash.   Neurological:  Negative for dizziness, syncope, weakness, light-headedness and headaches.       Objective   Medication Documentation Review Audit       Reviewed by Claudine Perez MD (Physician) on 07/24/23 at 1137      Medication Order Taking? Sig Documenting Provider Last Dose Status   aspirin 81 mg chewable tablet [id number]  Chew 1 tablet  (81 mg) once daily. Historical Provider, MD  Active   atorvastatin (Lipitor) 10 mg tablet [id number]  Take 1 tablet (10 mg) by mouth once daily. Claudine Perez MD  Active   azithromycin (Zithromax) 250 mg tablet [id number] Yes 1 tablet (250 mg). Marion Provider, MD  Active   Bifidobacterium infantis (Align) 4 mg capsule [id number]  Take by mouth. Marion Merchant MD  Active   calcium carbonate (CALTRATE 600 ORAL) [id number]  Caltrate Historical Provider, MD  Active   CALCIUM CITRATE-VITAMIN D3 ORAL [id number]  Take by mouth. Historical Provider, MD  Active   chlorhexidine (Peridex) 0.12 % solution [id number] Yes SWISH AND SPIT 15 MLS UNDILUTED SOLN TWICE DAILY ONE WEEK BEFORE AND 1 WEEK AFTER PROCEDURE Historical Provider, MD  Active   clobetasoL 0.05 % external spray [id number]  twice a day. Historical Provider, MD  Active   denosumab (Prolia) 60 mg/mL syringe [id number]  Inject 1 mL (60 mg) under the skin. Historical Provider, MD  Active   escitalopram (Lexapro) 10 mg tablet [id number]  Take 1 tablet (10 mg) by mouth once daily. Claudine Perez MD  Active   halobetasol (UltraVATE) 0.05 % ointment [id number]  twice a day. Historical Provider, MD  Active   ketorolac (Acular) 0.5 % ophthalmic solution [id number]  Administer 1 drop into the left eye. Marion Provider, MD  Active   lisinopril 5 mg tablet [id number]  Take 1 tablet (5 mg) by mouth once daily. Claudine Perez MD  Active   magnesium L-threon-niacinamide 42 mg (500 mg)- 250 mg tablet extended release [id number]  magnesium Marion Provider, MD  Active   metoprolol succinate XL (Toprol-XL) 100 mg 24 hr tablet [id number]  Take 1 tablet (100 mg) by mouth 2 times a day. Claudine Perez MD  Active   multivitamin tablet [id number]  Take by mouth. Marion Merchant MD  Active   oxyBUTYnin 3.9 mg/24 hr patch semiweekly [id number]   Marion Merchant MD  Active   oxybutynin XL (Ditropan-XL) 15 mg 24 hr tablet [id number]  Take 1 tablet (15 mg) by mouth once daily. Claudine Perez  "MD  Active   prenestelita75-iron fum-folic ac-om3 (One A Day Women's Prenatal DHA) 28 mg iron- 800 mcg combo pack [id number]  One A Day Women's Prenatal DHA Historical Provider, MD  Active   pyridoxine (Vitamin B-6) 100 mg tablet [id number]  Take 1 tablet (100 mg) by mouth. Historical Provider, MD  Active   vit A,C and E-lutein-minerals (Ocuvite with Lutein) 300 mcg-200 mg-27 mg-2 mg tablet [id number]  Take by mouth. Historical Provider, MD  Active   vitamin B complex vit C no.4 (SUPER B COMPLEX + C ORAL) [id number]  Super B Complex + C Historical Provider, MD  Active   vitamin E acid succinate (vitamin E succinate) 67 mg (100 unit) tablet [id number]  Take by mouth once daily. Historical Provider, MD  Active   vitamin E mixed 400 unit capsule [id number]  Take by oral route. Historical Provider, MD  Active                  Physical Exam  Constitutional:       Appearance: Normal appearance.   HENT:      Head: Normocephalic and atraumatic.      Nose: Nose normal.   Eyes:      Extraocular Movements: Extraocular movements intact.      Pupils: Pupils are equal, round, and reactive to light.   Cardiovascular:      Rate and Rhythm: Normal rate and regular rhythm.   Pulmonary:      Breath sounds: Normal breath sounds.   Abdominal:      General: Abdomen is flat. Bowel sounds are normal.      Palpations: Abdomen is soft.   Musculoskeletal:      Right lower leg: No edema.      Left lower leg: No edema.   Neurological:      Mental Status: She is alert.     /80 (BP Location: Left arm, Patient Position: Sitting)   Pulse 89   Ht 1.638 m (5' 4.5\")   Wt 76.8 kg (169 lb 6.4 oz)   SpO2 94%   BMI 28.63 kg/m²       Assessment/Plan   Problem List Items Addressed This Visit       Hypertension     Hypertension with poor control off lisinopril and metoprolol.  At this time we will start valsartan 80 mg daily and see her back in 1 month         Relevant Medications    valsartan (Diovan) 80 mg tablet    Elevated alkaline phosphatase level - Primary "    Relevant Orders    Alkaline Phosphatase, Isoenzymes    Acute cough              It has been a pleasure seeing you.

## 2023-07-24 NOTE — PATIENT INSTRUCTIONS
Start valsartan 80 mg once a day    Follow up Dr Perez in 1 month for HTN  30 min    Get labs for elevated alk Phos  
6

## 2023-07-24 NOTE — ASSESSMENT & PLAN NOTE
Hypertension with poor control off lisinopril and metoprolol.  At this time we will start valsartan 80 mg daily and see her back in 1 month

## 2023-07-25 LAB
ALANINE AMINOTRANSFERASE (SGPT) (U/L) IN SER/PLAS: 21 U/L (ref 7–45)
ALBUMIN (G/DL) IN SER/PLAS: 4.4 G/DL (ref 3.4–5)
ALKALINE PHOSPHATASE (U/L) IN SER/PLAS: 167 U/L (ref 33–136)
ANION GAP IN SER/PLAS: 11 MMOL/L (ref 10–20)
ASPARTATE AMINOTRANSFERASE (SGOT) (U/L) IN SER/PLAS: 28 U/L (ref 9–39)
BILIRUBIN TOTAL (MG/DL) IN SER/PLAS: 0.6 MG/DL (ref 0–1.2)
CALCIUM (MG/DL) IN SER/PLAS: 10.9 MG/DL (ref 8.6–10.6)
CARBON DIOXIDE, TOTAL (MMOL/L) IN SER/PLAS: 29 MMOL/L (ref 21–32)
CHLORIDE (MMOL/L) IN SER/PLAS: 105 MMOL/L (ref 98–107)
CHOLESTEROL (MG/DL) IN SER/PLAS: 179 MG/DL (ref 0–199)
CHOLESTEROL IN HDL (MG/DL) IN SER/PLAS: 82.1 MG/DL
CHOLESTEROL/HDL RATIO: 2.2
CREATININE (MG/DL) IN SER/PLAS: 0.87 MG/DL (ref 0.5–1.05)
ERYTHROCYTE DISTRIBUTION WIDTH (RATIO) BY AUTOMATED COUNT: 13.4 % (ref 11.5–14.5)
ERYTHROCYTE MEAN CORPUSCULAR HEMOGLOBIN CONCENTRATION (G/DL) BY AUTOMATED: 32.4 G/DL (ref 32–36)
ERYTHROCYTE MEAN CORPUSCULAR VOLUME (FL) BY AUTOMATED COUNT: 99 FL (ref 80–100)
ERYTHROCYTES (10*6/UL) IN BLOOD BY AUTOMATED COUNT: 4.95 X10E12/L (ref 4–5.2)
GFR FEMALE: 69 ML/MIN/1.73M2
GLUCOSE (MG/DL) IN SER/PLAS: 105 MG/DL (ref 74–99)
HEMATOCRIT (%) IN BLOOD BY AUTOMATED COUNT: 48.8 % (ref 36–46)
HEMOGLOBIN (G/DL) IN BLOOD: 15.8 G/DL (ref 12–16)
LDL: 73 MG/DL (ref 0–99)
LEUKOCYTES (10*3/UL) IN BLOOD BY AUTOMATED COUNT: 5.6 X10E9/L (ref 4.4–11.3)
NRBC (PER 100 WBCS) BY AUTOMATED COUNT: 0 /100 WBC (ref 0–0)
PLATELETS (10*3/UL) IN BLOOD AUTOMATED COUNT: 195 X10E9/L (ref 150–450)
POTASSIUM (MMOL/L) IN SER/PLAS: 4.3 MMOL/L (ref 3.5–5.3)
PROTEIN TOTAL: 7.7 G/DL (ref 6.4–8.2)
SODIUM (MMOL/L) IN SER/PLAS: 141 MMOL/L (ref 136–145)
THYROTROPIN (MIU/L) IN SER/PLAS BY DETECTION LIMIT <= 0.05 MIU/L: 1.24 MIU/L (ref 0.44–3.98)
TRIGLYCERIDE (MG/DL) IN SER/PLAS: 122 MG/DL (ref 0–149)
UREA NITROGEN (MG/DL) IN SER/PLAS: 15 MG/DL (ref 6–23)
VLDL: 24 MG/DL (ref 0–40)

## 2023-07-27 LAB — VITAMIN D 1,25-DIHYDROXY: 53.2 PG/ML (ref 19.9–79.3)

## 2023-07-28 ENCOUNTER — TELEPHONE (OUTPATIENT)
Dept: PRIMARY CARE | Facility: CLINIC | Age: 78
End: 2023-07-28
Payer: MEDICARE

## 2023-07-28 DIAGNOSIS — R74.8 HIGH SERUM BONE-SPECIFIC ALKALINE PHOSPHATASE: ICD-10-CM

## 2023-07-28 DIAGNOSIS — R74.8 ELEVATED LIVER ENZYMES: Primary | ICD-10-CM

## 2023-07-28 DIAGNOSIS — R74.8 ELEVATED ALKALINE PHOSPHATASE LEVEL: ICD-10-CM

## 2023-07-28 LAB
ALKALINE PHOSPHATASE (REF): 189 U/L (ref 40–120)
ALKALINE PHOSPHATASE OTHER: 0 U/L
ALKALINE PHOSPHATASE.BONE (U/L) IN SERUM OR PLASMA: 125 U/L (ref 0–55)
ALKALINE PHOSPHATASE.LIVER (U/L) IN SERUM OR PLASMA: 64 U/L (ref 0–94)

## 2023-07-28 NOTE — TELEPHONE ENCOUNTER
----- Message from Claudine Perez MD sent at 7/28/2023 10:25 AM EDT -----  Please notify patient her alk phos is still elevated and it is specifically coming from the bone.  For this reason I would like her to go back and get a little more blood work and something called a PTH or parathyroid hormone level as well as a bony survey to determine if we can determine the source of her elevated alk phos.  Orders have been placed in epic and she can get these completed downstairs in our radiology and lab departments.  As soon as she has completed these studies I will be in touch with her.

## 2023-08-03 ENCOUNTER — LAB (OUTPATIENT)
Dept: LAB | Facility: LAB | Age: 78
End: 2023-08-03
Payer: MEDICARE

## 2023-08-03 DIAGNOSIS — R74.8 ELEVATED LIVER ENZYMES: ICD-10-CM

## 2023-08-03 DIAGNOSIS — R74.8 ELEVATED ALKALINE PHOSPHATASE LEVEL: ICD-10-CM

## 2023-08-03 DIAGNOSIS — R74.8 HIGH SERUM BONE-SPECIFIC ALKALINE PHOSPHATASE: ICD-10-CM

## 2023-08-03 PROCEDURE — 83970 ASSAY OF PARATHORMONE: CPT

## 2023-08-03 PROCEDURE — 36415 COLL VENOUS BLD VENIPUNCTURE: CPT

## 2023-08-04 ENCOUNTER — TELEPHONE (OUTPATIENT)
Dept: PRIMARY CARE | Facility: CLINIC | Age: 78
End: 2023-08-04
Payer: MEDICARE

## 2023-08-04 LAB — PARATHYRIN INTACT (PG/ML) IN SER/PLAS: 62 PG/ML (ref 18.5–88)

## 2023-08-04 NOTE — TELEPHONE ENCOUNTER
Patient notified and read message.  BN     ----- Message from Claudine Perez MD sent at 8/4/2023  7:31 AM EDT -----  Please notify patient her PTH or parathyroid hormone level is normal.  When we get her bone survey back we will let her know the results.

## 2023-08-24 ENCOUNTER — OFFICE VISIT (OUTPATIENT)
Dept: PRIMARY CARE | Facility: CLINIC | Age: 78
End: 2023-08-24
Payer: MEDICARE

## 2023-08-24 VITALS
DIASTOLIC BLOOD PRESSURE: 68 MMHG | HEART RATE: 84 BPM | BODY MASS INDEX: 29.5 KG/M2 | OXYGEN SATURATION: 95 % | SYSTOLIC BLOOD PRESSURE: 124 MMHG | WEIGHT: 172.8 LBS | HEIGHT: 64 IN

## 2023-08-24 DIAGNOSIS — F32.1 CURRENT MODERATE EPISODE OF MAJOR DEPRESSIVE DISORDER WITHOUT PRIOR EPISODE (MULTI): ICD-10-CM

## 2023-08-24 DIAGNOSIS — R74.8 ELEVATED ALKALINE PHOSPHATASE LEVEL: ICD-10-CM

## 2023-08-24 DIAGNOSIS — E78.00 HYPERCHOLESTEROLEMIA: ICD-10-CM

## 2023-08-24 DIAGNOSIS — I10 PRIMARY HYPERTENSION: Primary | ICD-10-CM

## 2023-08-24 DIAGNOSIS — R74.8 ELEVATED LIVER ENZYMES: ICD-10-CM

## 2023-08-24 PROCEDURE — 1036F TOBACCO NON-USER: CPT | Performed by: INTERNAL MEDICINE

## 2023-08-24 PROCEDURE — 1157F ADVNC CARE PLAN IN RCRD: CPT | Performed by: INTERNAL MEDICINE

## 2023-08-24 PROCEDURE — 99214 OFFICE O/P EST MOD 30 MIN: CPT | Performed by: INTERNAL MEDICINE

## 2023-08-24 PROCEDURE — 1159F MED LIST DOCD IN RCRD: CPT | Performed by: INTERNAL MEDICINE

## 2023-08-24 PROCEDURE — 1125F AMNT PAIN NOTED PAIN PRSNT: CPT | Performed by: INTERNAL MEDICINE

## 2023-08-24 PROCEDURE — 3074F SYST BP LT 130 MM HG: CPT | Performed by: INTERNAL MEDICINE

## 2023-08-24 PROCEDURE — 3078F DIAST BP <80 MM HG: CPT | Performed by: INTERNAL MEDICINE

## 2023-08-24 PROCEDURE — 1160F RVW MEDS BY RX/DR IN RCRD: CPT | Performed by: INTERNAL MEDICINE

## 2023-08-24 ASSESSMENT — ENCOUNTER SYMPTOMS
NAUSEA: 0
SORE THROAT: 0
HEMATURIA: 0
DIARRHEA: 0
CHILLS: 0
HEADACHES: 0
SHORTNESS OF BREATH: 0
WEAKNESS: 0
FEVER: 0
ARTHRALGIAS: 0
CONSTIPATION: 0
MYALGIAS: 0
LIGHT-HEADEDNESS: 0
DYSURIA: 0
DIFFICULTY URINATING: 0
FATIGUE: 0
COUGH: 0
FREQUENCY: 0
DIZZINESS: 0
PALPITATIONS: 0
VOMITING: 0

## 2023-08-24 ASSESSMENT — PATIENT HEALTH QUESTIONNAIRE - PHQ9
1. LITTLE INTEREST OR PLEASURE IN DOING THINGS: NOT AT ALL
2. FEELING DOWN, DEPRESSED OR HOPELESS: NOT AT ALL
SUM OF ALL RESPONSES TO PHQ9 QUESTIONS 1 AND 2: 0

## 2023-08-24 ASSESSMENT — PAIN SCALES - GENERAL: PAINLEVEL: 4

## 2023-08-24 NOTE — PATIENT INSTRUCTIONS
Follow up Dr Perez in 4 months for hTn etc    Please check with [facility] and get bone survey if not already completed.

## 2023-08-24 NOTE — PROGRESS NOTES
Subjective   Patient ID: Shona Garduno is a 78 y.o. female who presents for 4 month re for GERD, cholesterol, and HTN management.  BN    Patient is here for 4-month follow-up on hypertension follow-up and also to follow-up on her bone density and her bone survey.  During annual blood work we found an elevated alk phos.  The alk phos is derived from the bone as she is supposed to have a bone survey.  Was ordered but she has not completed it yet so we will get her to complete that as soon as possible    Patient was on Prolia but stopped it because she was having difficulties with jaw necrosis but her most recent bone density test actually shows some slight improvement she now has only osteopenia.  For now she will stay off the Prolia or bisphosphonates and until we complete working up her bone survey and the source of her elevated alkaline phosphatase.        Review of Systems   Constitutional:  Negative for chills, fatigue and fever.   HENT:  Negative for sore throat.    Eyes:  Negative for visual disturbance.   Respiratory:  Negative for cough and shortness of breath.    Cardiovascular:  Negative for chest pain, palpitations and leg swelling.   Gastrointestinal:  Negative for constipation, diarrhea, nausea and vomiting.   Genitourinary:  Negative for difficulty urinating, dysuria, frequency, hematuria and urgency.   Musculoskeletal:  Negative for arthralgias and myalgias.   Skin:  Negative for rash.   Neurological:  Negative for dizziness, syncope, weakness, light-headedness and headaches.       Objective   Medication Documentation Review Audit       Reviewed by Claudine Perez MD (Physician) on 08/24/23 at 1122      Medication Order Taking? Sig Documenting Provider Last Dose Status   aspirin 81 mg chewable tablet [id number]  Chew 1 tablet (81 mg) once daily. Historical Provider, MD  Active   atorvastatin (Lipitor) 10 mg tablet [id number]  Take 1 tablet (10 mg) by mouth once daily. Claudine Perez MD  Active    azithromycin (Zithromax) 250 mg tablet [id number]  1 tablet (250 mg). Historical Provider, MD  Active   Bifidobacterium infantis (Align) 4 mg capsule [id number]  Take by mouth. Historical Provider, MD  Active   calcium carbonate (CALTRATE 600 ORAL) [id number]  Caltrate Historical Provider, MD  Active   CALCIUM CITRATE-VITAMIN D3 ORAL [id number]  Take by mouth. Historical Provider, MD  Active   chlorhexidine (Peridex) 0.12 % solution [id number]  SWISH AND SPIT 15 MLS UNDILUTED SOLN TWICE DAILY ONE WEEK BEFORE AND 1 WEEK AFTER PROCEDURE Historical Provider, MD  Active   clobetasoL 0.05 % external spray [id number]  twice a day. Historical Provider, MD  Active   denosumab (Prolia) 60 mg/mL syringe [id number]  Inject 1 mL (60 mg) under the skin. Historical Provider, MD  Active   escitalopram (Lexapro) 10 mg tablet [id number]  Take 1 tablet (10 mg) by mouth once daily. Claudine Perez MD  Active   halobetasol (UltraVATE) 0.05 % ointment [id number]  twice a day. Historical Provider, MD  Active   ketorolac (Acular) 0.5 % ophthalmic solution [id number]  Administer 1 drop into the left eye. Historical Provider, MD  Active   magnesium L-threon-niacinamide 42 mg (500 mg)- 250 mg tablet extended release [id number]  magnesium Historical Provider, MD  Active   multivitamin tablet [id number]  Take by mouth. Historical Provider, MD  Active   oxyBUTYnin 3.9 mg/24 hr patch semiweekly [id number]   Historical Provider, MD  Active   oxybutynin XL (Ditropan-XL) 15 mg 24 hr tablet [id number]  Take 1 tablet (15 mg) by mouth once daily. Claudine Perez MD  Active   prenat75-iron fum-folic ac-om3 (One A Day Women's Prenatal DHA) 28 mg iron- 800 mcg combo pack [id number]  One A Day Women's Prenatal DHA Historical Provider, MD  Active   pyridoxine (Vitamin B-6) 100 mg tablet [id number]  Take 1 tablet (100 mg) by mouth. Historical Provider, MD  Active   valsartan (Diovan) 80 mg tablet [id number]  Take 1 tablet (80 mg) by mouth once daily. Claudine Perez MD  Active   vit A,C  "and E-lutein-minerals (Ocuvite with Lutein) 300 mcg-200 mg-27 mg-2 mg tablet [id number]  Take by mouth. Historical Provider, MD  Active   vitamin B complex vit C no.4 (SUPER B COMPLEX + C ORAL) [id number]  Super B Complex + C Historical Provider, MD  Active   vitamin E acid succinate (vitamin E succinate) 67 mg (100 unit) tablet [id number]  Take by mouth once daily. Historical Provider, MD  Active   vitamin E mixed 400 unit capsule [id number]  Take by oral route. Historical Provider, MD  Active                  Physical Exam  Constitutional:       Appearance: Normal appearance.   HENT:      Head: Normocephalic and atraumatic.      Nose: Nose normal.   Eyes:      Extraocular Movements: Extraocular movements intact.      Pupils: Pupils are equal, round, and reactive to light.   Cardiovascular:      Rate and Rhythm: Normal rate and regular rhythm.   Pulmonary:      Breath sounds: Normal breath sounds.   Abdominal:      General: Abdomen is flat. Bowel sounds are normal.      Palpations: Abdomen is soft.   Musculoskeletal:      Right lower leg: No edema.      Left lower leg: No edema.   Neurological:      Mental Status: She is alert.     /68 (BP Location: Left arm, Patient Position: Sitting)   Pulse 84   Ht 1.613 m (5' 3.5\") Comment: w/o shoes  Wt 78.4 kg (172 lb 12.8 oz)   SpO2 95%   BMI 30.13 kg/m²       Assessment/Plan   Problem List Items Addressed This Visit       Current moderate episode of major depressive disorder (CMS/HCC)    Elevated liver enzymes    Hypercholesterolemia     The labs were completed in July and cholesterol levels look good liver enzymes normal         Hypertension - Primary     Hypertension is well controlled even at home so she will stay on her current medications.         Elevated alkaline phosphatase level     Patient's alkaline phosphatase is elevated and appears to be from a bony derivative.  Bone survey is still pending.  Once we have this report back we will determine if further " evaluation is necessary and oriented.  I did briefly discuss it with the patient today including the possibility of Paget's disease                Regular follow-up with me will be in 4 months but I will be in touch with the patient as soon as she gets the bone survey completed.       It has been a pleasure seeing you.

## 2023-08-24 NOTE — ASSESSMENT & PLAN NOTE
Patient's alkaline phosphatase is elevated and appears to be from a bony derivative.  Bone survey is still pending.  Once we have this report back we will determine if further evaluation is necessary and oriented.  I did briefly discuss it with the patient today including the possibility of Paget's disease

## 2023-08-29 ENCOUNTER — TELEPHONE (OUTPATIENT)
Dept: PRIMARY CARE | Facility: CLINIC | Age: 78
End: 2023-08-29
Payer: MEDICARE

## 2023-10-23 DIAGNOSIS — I10 PRIMARY HYPERTENSION: ICD-10-CM

## 2023-10-23 RX ORDER — VALSARTAN 80 MG/1
80 TABLET ORAL DAILY
Qty: 30 TABLET | Refills: 0 | Status: SHIPPED | OUTPATIENT
Start: 2023-10-23 | End: 2023-11-17 | Stop reason: SDUPTHER

## 2023-11-17 DIAGNOSIS — I10 PRIMARY HYPERTENSION: ICD-10-CM

## 2023-11-17 RX ORDER — VALSARTAN 80 MG/1
80 TABLET ORAL DAILY
Qty: 30 TABLET | Refills: 0 | Status: SHIPPED | OUTPATIENT
Start: 2023-11-17 | End: 2023-11-17 | Stop reason: SDUPTHER

## 2023-11-17 NOTE — TELEPHONE ENCOUNTER
----- Message from Shona Garduno sent at 11/17/2023  2:48 PM EST -----  Regarding: Valastran   Contact: [phone]  I need valsartan 80 mg refilled. I will run out in a few days and don’t see Dr Perez until December 14.

## 2023-11-19 RX ORDER — VALSARTAN 80 MG/1
80 TABLET ORAL DAILY
Qty: 30 TABLET | Refills: 0 | Status: SHIPPED | OUTPATIENT
Start: 2023-11-19 | End: 2023-12-14 | Stop reason: SDUPTHER

## 2023-12-14 ENCOUNTER — OFFICE VISIT (OUTPATIENT)
Dept: PRIMARY CARE | Facility: CLINIC | Age: 78
End: 2023-12-14
Payer: MEDICARE

## 2023-12-14 VITALS
HEART RATE: 74 BPM | SYSTOLIC BLOOD PRESSURE: 123 MMHG | DIASTOLIC BLOOD PRESSURE: 75 MMHG | WEIGHT: 168.8 LBS | BODY MASS INDEX: 28.82 KG/M2 | HEIGHT: 64 IN | OXYGEN SATURATION: 95 %

## 2023-12-14 DIAGNOSIS — I10 PRIMARY HYPERTENSION: ICD-10-CM

## 2023-12-14 DIAGNOSIS — R79.89 LFT ELEVATION: ICD-10-CM

## 2023-12-14 DIAGNOSIS — N18.31 CHRONIC RENAL FAILURE, STAGE 3A (MULTI): ICD-10-CM

## 2023-12-14 DIAGNOSIS — R74.8 ELEVATED ALKALINE PHOSPHATASE LEVEL: ICD-10-CM

## 2023-12-14 DIAGNOSIS — E78.00 HYPERCHOLESTEROLEMIA: Primary | ICD-10-CM

## 2023-12-14 DIAGNOSIS — M81.0 AGE-RELATED OSTEOPOROSIS WITHOUT CURRENT PATHOLOGICAL FRACTURE: ICD-10-CM

## 2023-12-14 PROBLEM — M79.661 RIGHT CALF PAIN: Status: RESOLVED | Noted: 2023-06-26 | Resolved: 2023-12-14

## 2023-12-14 PROBLEM — R05.1 ACUTE COUGH: Status: RESOLVED | Noted: 2023-07-24 | Resolved: 2023-12-14

## 2023-12-14 PROCEDURE — 3078F DIAST BP <80 MM HG: CPT | Performed by: INTERNAL MEDICINE

## 2023-12-14 PROCEDURE — 99214 OFFICE O/P EST MOD 30 MIN: CPT | Performed by: INTERNAL MEDICINE

## 2023-12-14 PROCEDURE — 1126F AMNT PAIN NOTED NONE PRSNT: CPT | Performed by: INTERNAL MEDICINE

## 2023-12-14 PROCEDURE — 1159F MED LIST DOCD IN RCRD: CPT | Performed by: INTERNAL MEDICINE

## 2023-12-14 PROCEDURE — 3074F SYST BP LT 130 MM HG: CPT | Performed by: INTERNAL MEDICINE

## 2023-12-14 PROCEDURE — 1036F TOBACCO NON-USER: CPT | Performed by: INTERNAL MEDICINE

## 2023-12-14 PROCEDURE — 1160F RVW MEDS BY RX/DR IN RCRD: CPT | Performed by: INTERNAL MEDICINE

## 2023-12-14 RX ORDER — VALSARTAN 80 MG/1
80 TABLET ORAL DAILY
Qty: 90 TABLET | Refills: 3 | Status: SHIPPED | OUTPATIENT
Start: 2023-12-14

## 2023-12-14 ASSESSMENT — ENCOUNTER SYMPTOMS
SHORTNESS OF BREATH: 0
NAUSEA: 0
LIGHT-HEADEDNESS: 0
PALPITATIONS: 0
CHILLS: 0
HEADACHES: 0
ARTHRALGIAS: 0
FATIGUE: 0
WEAKNESS: 0
SORE THROAT: 0
DIFFICULTY URINATING: 0
DIARRHEA: 0
CONSTIPATION: 0
COUGH: 0
FEVER: 0
FREQUENCY: 0
DYSURIA: 0
HEMATURIA: 0
VOMITING: 0
MYALGIAS: 0
DIZZINESS: 0

## 2023-12-14 ASSESSMENT — PAIN SCALES - GENERAL: PAINLEVEL: 0-NO PAIN

## 2023-12-14 NOTE — PROGRESS NOTES
Subjective   Patient ID: Shona Garduno is a 78 y.o. female who presents for 4 month follow up for general health management.  BN    Patient is here for routine 4-month follow-up for management of her osteoporosis, hypertension and medication management.  While reviewing her medications today we discussed the fact that she gets a hot flash every morning after taking her meds.  I suspect the most likely cause is her multivitamin as one of them probably has niacin and it and have reviewed all her vitamins.  She should continue to take the Ocuvite, calcium and vitamin D but other than that all the other vitamins she is taking are not necessary and she can stop them.  I believe this should help stop the hot flashes        Review of Systems   Constitutional:  Negative for chills, fatigue and fever.   HENT:  Negative for sore throat.    Eyes:  Negative for visual disturbance.   Respiratory:  Negative for cough and shortness of breath.    Cardiovascular:  Negative for chest pain, palpitations and leg swelling.   Gastrointestinal:  Negative for constipation, diarrhea, nausea and vomiting.   Genitourinary:  Negative for difficulty urinating, dysuria, frequency, hematuria and urgency.   Musculoskeletal:  Negative for arthralgias and myalgias.   Skin:  Negative for rash.   Neurological:  Negative for dizziness, syncope, weakness, light-headedness and headaches.       Objective   Medication Documentation Review Audit       Reviewed by Claudine Perez MD (Physician) on 12/14/23 at 1207      Medication Order Taking? Sig Documenting Provider Last Dose Status   aspirin 81 mg chewable tablet [id number]  Chew 1 tablet (81 mg) once daily. Historical Provider, MD  Active   atorvastatin (Lipitor) 10 mg tablet [id number]  Take 1 tablet (10 mg) by mouth once daily. Claudine Perez MD  Active   azithromycin (Zithromax) 250 mg tablet [id number]  1 tablet (250 mg). Historical Provider, MD  Active   Bifidobacterium infantis (Align) 4 mg  capsule [id number]  Take by mouth. Historical Provider, MD  Active   calcium carbonate (CALTRATE 600 ORAL) [id number]  Caltrate Historical Provider, MD  Active   CALCIUM CITRATE-VITAMIN D3 ORAL [id number]  Take by mouth. Historical Provider, MD  Active   chlorhexidine (Peridex) 0.12 % solution [id number]  SWISH AND SPIT 15 MLS UNDILUTED SOLN TWICE DAILY ONE WEEK BEFORE AND 1 WEEK AFTER PROCEDURE Historical Provider, MD  Active   clobetasoL 0.05 % external spray [id number]  twice a day. Historical Provider, MD  Active   denosumab (Prolia) 60 mg/mL syringe [id number]  Inject 1 mL (60 mg) under the skin. Historical Provider, MD  Active   escitalopram (Lexapro) 10 mg tablet [id number]  Take 1 tablet (10 mg) by mouth once daily. Claudine Perez MD  Active   halobetasol (UltraVATE) 0.05 % ointment [id number]  twice a day. Historical Provider, MD  Active   ketorolac (Acular) 0.5 % ophthalmic solution [id number]  Administer 1 drop into the left eye. Historical Provider, MD  Active   magnesium L-threon-niacinamide 42 mg (500 mg)- 250 mg tablet extended release [id number]  magnesium Historical Provider, MD  Active   multivitamin tablet [id number]  Take by mouth. Historical Provider, MD  Active   oxyBUTYnin 3.9 mg/24 hr patch semiweekly [id number]   Historical Provider, MD  Active   oxybutynin XL (Ditropan-XL) 15 mg 24 hr tablet [id number]  Take 1 tablet (15 mg) by mouth once daily. Claudine Perez MD  Active   prenat75-iron fum-folic ac-om3 (One A Day Women's Prenatal DHA) 28 mg iron- 800 mcg combo pack [id number]  One A Day Women's Prenatal DHA Historical Provider, MD  Active   pyridoxine (Vitamin B-6) 100 mg tablet [id number]  Take 1 tablet (100 mg) by mouth. Historical Provider, MD  Active   valsartan (Diovan) 80 mg tablet [id number]  Take 1 tablet (80 mg) by mouth once daily. Claudine Perez MD  Active   vit A,C and E-lutein-minerals (Ocuvite with Lutein) 300 mcg-200 mg-27 mg-2 mg tablet [id number]  Take by mouth. Historical Provider, MD  Active   vitamin  "B complex vit C no.4 (SUPER B COMPLEX + C ORAL) [id number]  Super B Complex + C Historical Provider, MD  Active   vitamin E acid succinate (vitamin E succinate) 67 mg (100 unit) tablet [id number]  Take by mouth once daily. Historical Provider, MD  Active   vitamin E mixed 400 unit capsule [id number]  Take by oral route. Historical Provider, MD  Active                  Allergies   Allergen Reactions    Acetaminophen Hives    Erythromycin Unknown    Penicillins Rash     Physical Exam  Constitutional:       Appearance: Normal appearance.   HENT:      Head: Normocephalic and atraumatic.      Nose: Nose normal.   Eyes:      Extraocular Movements: Extraocular movements intact.      Pupils: Pupils are equal, round, and reactive to light.   Cardiovascular:      Rate and Rhythm: Normal rate and regular rhythm.   Pulmonary:      Breath sounds: Normal breath sounds.   Abdominal:      General: Abdomen is flat. Bowel sounds are normal.      Palpations: Abdomen is soft.   Musculoskeletal:      Right lower leg: No edema.      Left lower leg: No edema.   Neurological:      Mental Status: She is alert.     /75 (BP Location: Left arm, Patient Position: Sitting)   Pulse 74   Ht 1.613 m (5' 3.5\") Comment: w/o shoes  Wt 76.6 kg (168 lb 12.8 oz)   SpO2 95%   BMI 29.43 kg/m²       Assessment/Plan   Problem List Items Addressed This Visit       Chronic renal failure, stage 3a (CMS/HCC)    Hypercholesterolemia - Primary     Annual labs are due in July and patient is stable on atorvastatin 10 mg daily.         Hypertension     Hypertension is stable and well-controlled.         Relevant Medications    valsartan (Diovan) 80 mg tablet    LFT elevation     Patient's liver enzymes specifically alk phos were elevated this past summer.  Fraction fractionated alk phos showed a bony component so a bone survey was performed which was normal.  It may have been her dental work that was triggering this so we will check it again next summer         " Osteoporosis     Patient's Prolia has been on hold because of multiple dental issues and dental work.  I recommended we restart the Prolia in the spring when she returns from [place] since her dental work is now completed  She was reminded to continue taking calcium and vitamin D.         Elevated alkaline phosphatase level     Follow-up in April with annual wellness visit       It has been a pleasure seeing you.  Claudine Perez MD

## 2023-12-14 NOTE — ASSESSMENT & PLAN NOTE
Patient's liver enzymes specifically alk phos were elevated this past summer.  Fraction fractionated alk phos showed a bony component so a bone survey was performed which was normal.  It may have been her dental work that was triggering this so we will check it again next summer

## 2023-12-14 NOTE — ASSESSMENT & PLAN NOTE
Patient's Prolia has been on hold because of multiple dental issues and dental work.  I recommended we restart the Prolia in the spring when she returns from [place] since her dental work is now completed  She was reminded to continue taking calcium and vitamin D.

## 2023-12-28 ENCOUNTER — TELEPHONE (OUTPATIENT)
Dept: PRIMARY CARE | Facility: CLINIC | Age: 78
End: 2023-12-28
Payer: MEDICARE

## 2023-12-28 NOTE — TELEPHONE ENCOUNTER
I called and spoke to the patient.  Today is day 5 of COVID so Paxlovid could still be used but probably would not help much.  I reviewed with the patient potential side effects including liver problems with the Paxlovid and she has decided not to take it if she is not that ill.  She does want to go to a  this weekend so I did recommend she wear an N95 mask to help protect others around her but she had no fevers or chills and felt up to when she could go.  I did recommend pushing Advil and Tylenol alternating them for the muscle aches and making sure she drinks plenty of fluids including electrolytes like Gatorade or Pedialyte.  Patient stated understanding and will try and complete

## 2023-12-28 NOTE — TELEPHONE ENCOUNTER
Pt has called in and has tested positive for Covid 12/27/23. This was a home test. Symptoms started 12/24/23   First time having covid? Yes  Employed in Health Care? no  Traveled recently? no  Fever/chills/shaking? YES  Sore throat/hoarseness? YES  Cough/sputum/color? COUGHING ALL NIGHT-SOMETIMES SPUTUM IS CLEAR  SOB/wheezing? SOB -POX WAS 93%-94% YESTERDAY  Nasal congestion? YES  Headache? YES  Loss of sense of smell/taste? SMELL  Body aches/malaise? YES  D/N/V? DIARRHEA    [facility]  OR [facility]

## 2024-04-25 ENCOUNTER — TELEPHONE (OUTPATIENT)
Dept: PRIMARY CARE | Facility: CLINIC | Age: 79
End: 2024-04-25
Payer: MEDICARE

## 2024-04-25 DIAGNOSIS — E78.00 HYPERCHOLESTEROLEMIA: ICD-10-CM

## 2024-04-25 DIAGNOSIS — I10 PRIMARY HYPERTENSION: ICD-10-CM

## 2024-04-25 DIAGNOSIS — Z12.31 SCREENING MAMMOGRAM, ENCOUNTER FOR: ICD-10-CM

## 2024-04-25 DIAGNOSIS — N18.31 CHRONIC RENAL FAILURE, STAGE 3A (MULTI): ICD-10-CM

## 2024-04-25 NOTE — TELEPHONE ENCOUNTER
Spoke with patient regarding Wellness visit next week, updated on lab work and Mammogram that will be due Sept 2024.  Discussed ACP documents, if updated to bring in copy for office to have scanned into our system.

## 2024-04-26 DIAGNOSIS — Z12.31 SCREENING MAMMOGRAM, ENCOUNTER FOR: ICD-10-CM

## 2024-04-27 ENCOUNTER — LAB (OUTPATIENT)
Dept: LAB | Facility: LAB | Age: 79
End: 2024-04-27
Payer: MEDICARE

## 2024-04-27 DIAGNOSIS — N18.31 CHRONIC RENAL FAILURE, STAGE 3A (MULTI): ICD-10-CM

## 2024-04-27 DIAGNOSIS — I10 PRIMARY HYPERTENSION: ICD-10-CM

## 2024-04-27 DIAGNOSIS — E78.00 HYPERCHOLESTEROLEMIA: ICD-10-CM

## 2024-04-27 PROCEDURE — 84443 ASSAY THYROID STIM HORMONE: CPT

## 2024-04-27 PROCEDURE — 82652 VIT D 1 25-DIHYDROXY: CPT

## 2024-04-27 PROCEDURE — 36415 COLL VENOUS BLD VENIPUNCTURE: CPT

## 2024-04-27 PROCEDURE — 85025 COMPLETE CBC W/AUTO DIFF WBC: CPT

## 2024-04-27 PROCEDURE — 80053 COMPREHEN METABOLIC PANEL: CPT

## 2024-04-27 PROCEDURE — 80061 LIPID PANEL: CPT

## 2024-04-28 LAB
ALBUMIN SERPL BCP-MCNC: 4.3 G/DL (ref 3.4–5)
ALP SERPL-CCNC: 98 U/L (ref 33–136)
ALT SERPL W P-5'-P-CCNC: 37 U/L (ref 7–45)
ANION GAP SERPL CALC-SCNC: 12 MMOL/L (ref 10–20)
AST SERPL W P-5'-P-CCNC: 36 U/L (ref 9–39)
BASOPHILS # BLD AUTO: 0.03 X10*3/UL (ref 0–0.1)
BASOPHILS NFR BLD AUTO: 0.6 %
BILIRUB SERPL-MCNC: 0.7 MG/DL (ref 0–1.2)
BUN SERPL-MCNC: 15 MG/DL (ref 6–23)
CALCIUM SERPL-MCNC: 10.1 MG/DL (ref 8.6–10.6)
CHLORIDE SERPL-SCNC: 106 MMOL/L (ref 98–107)
CHOLEST SERPL-MCNC: 177 MG/DL (ref 0–199)
CHOLESTEROL/HDL RATIO: 2.3
CO2 SERPL-SCNC: 26 MMOL/L (ref 21–32)
CREAT SERPL-MCNC: 1 MG/DL (ref 0.5–1.05)
EGFRCR SERPLBLD CKD-EPI 2021: 58 ML/MIN/1.73M*2
EOSINOPHIL # BLD AUTO: 0.22 X10*3/UL (ref 0–0.4)
EOSINOPHIL NFR BLD AUTO: 4.1 %
ERYTHROCYTE [DISTWIDTH] IN BLOOD BY AUTOMATED COUNT: 13.4 % (ref 11.5–14.5)
GLUCOSE SERPL-MCNC: 95 MG/DL (ref 74–99)
HCT VFR BLD AUTO: 45.1 % (ref 36–46)
HDLC SERPL-MCNC: 78.4 MG/DL
HGB BLD-MCNC: 15 G/DL (ref 12–16)
IMM GRANULOCYTES # BLD AUTO: 0 X10*3/UL (ref 0–0.5)
IMM GRANULOCYTES NFR BLD AUTO: 0 % (ref 0–0.9)
LDLC SERPL CALC-MCNC: 76 MG/DL
LYMPHOCYTES # BLD AUTO: 2.46 X10*3/UL (ref 0.8–3)
LYMPHOCYTES NFR BLD AUTO: 45.6 %
MCH RBC QN AUTO: 32.5 PG (ref 26–34)
MCHC RBC AUTO-ENTMCNC: 33.3 G/DL (ref 32–36)
MCV RBC AUTO: 98 FL (ref 80–100)
MONOCYTES # BLD AUTO: 0.7 X10*3/UL (ref 0.05–0.8)
MONOCYTES NFR BLD AUTO: 13 %
NEUTROPHILS # BLD AUTO: 1.98 X10*3/UL (ref 1.6–5.5)
NEUTROPHILS NFR BLD AUTO: 36.7 %
NON HDL CHOLESTEROL: 99 MG/DL (ref 0–149)
NRBC BLD-RTO: 0 /100 WBCS (ref 0–0)
PLATELET # BLD AUTO: 186 X10*3/UL (ref 150–450)
POTASSIUM SERPL-SCNC: 4.5 MMOL/L (ref 3.5–5.3)
PROT SERPL-MCNC: 7 G/DL (ref 6.4–8.2)
RBC # BLD AUTO: 4.62 X10*6/UL (ref 4–5.2)
SODIUM SERPL-SCNC: 139 MMOL/L (ref 136–145)
TRIGL SERPL-MCNC: 115 MG/DL (ref 0–149)
TSH SERPL-ACNC: 2.15 MIU/L (ref 0.44–3.98)
VLDL: 23 MG/DL (ref 0–40)
WBC # BLD AUTO: 5.4 X10*3/UL (ref 4.4–11.3)

## 2024-04-29 ENCOUNTER — OFFICE VISIT (OUTPATIENT)
Dept: PRIMARY CARE | Facility: CLINIC | Age: 79
End: 2024-04-29
Payer: MEDICARE

## 2024-04-29 VITALS
DIASTOLIC BLOOD PRESSURE: 70 MMHG | HEIGHT: 64 IN | HEART RATE: 83 BPM | WEIGHT: 168 LBS | OXYGEN SATURATION: 95 % | BODY MASS INDEX: 28.68 KG/M2 | SYSTOLIC BLOOD PRESSURE: 122 MMHG

## 2024-04-29 DIAGNOSIS — Z00.00 WELLNESS EXAMINATION: ICD-10-CM

## 2024-04-29 DIAGNOSIS — Z00.00 ROUTINE GENERAL MEDICAL EXAMINATION AT HEALTH CARE FACILITY: Primary | ICD-10-CM

## 2024-04-29 DIAGNOSIS — I10 PRIMARY HYPERTENSION: ICD-10-CM

## 2024-04-29 DIAGNOSIS — Z13.89 SCREENING FOR MULTIPLE CONDITIONS: ICD-10-CM

## 2024-04-29 DIAGNOSIS — R32 URINARY INCONTINENCE, UNSPECIFIED TYPE: ICD-10-CM

## 2024-04-29 DIAGNOSIS — M81.0 AGE-RELATED OSTEOPOROSIS WITHOUT CURRENT PATHOLOGICAL FRACTURE: ICD-10-CM

## 2024-04-29 DIAGNOSIS — Z71.89 CARDIAC RISK COUNSELING: ICD-10-CM

## 2024-04-29 DIAGNOSIS — K21.9 GASTROESOPHAGEAL REFLUX DISEASE WITHOUT ESOPHAGITIS: ICD-10-CM

## 2024-04-29 DIAGNOSIS — N18.31 CHRONIC RENAL FAILURE, STAGE 3A (MULTI): ICD-10-CM

## 2024-04-29 DIAGNOSIS — E78.00 HYPERCHOLESTEROLEMIA: ICD-10-CM

## 2024-04-29 DIAGNOSIS — F32.1 CURRENT MODERATE EPISODE OF MAJOR DEPRESSIVE DISORDER WITHOUT PRIOR EPISODE (MULTI): ICD-10-CM

## 2024-04-29 DIAGNOSIS — Z13.39 ALCOHOL SCREENING: ICD-10-CM

## 2024-04-29 DIAGNOSIS — Z78.9 FULL CODE STATUS: ICD-10-CM

## 2024-04-29 PROCEDURE — G0439 PPPS, SUBSEQ VISIT: HCPCS | Performed by: INTERNAL MEDICINE

## 2024-04-29 PROCEDURE — 1157F ADVNC CARE PLAN IN RCRD: CPT | Performed by: INTERNAL MEDICINE

## 2024-04-29 PROCEDURE — 1170F FXNL STATUS ASSESSED: CPT | Performed by: INTERNAL MEDICINE

## 2024-04-29 PROCEDURE — G0446 INTENS BEHAVE THER CARDIO DX: HCPCS | Performed by: INTERNAL MEDICINE

## 2024-04-29 PROCEDURE — 3078F DIAST BP <80 MM HG: CPT | Performed by: INTERNAL MEDICINE

## 2024-04-29 PROCEDURE — 1160F RVW MEDS BY RX/DR IN RCRD: CPT | Performed by: INTERNAL MEDICINE

## 2024-04-29 PROCEDURE — 1036F TOBACCO NON-USER: CPT | Performed by: INTERNAL MEDICINE

## 2024-04-29 PROCEDURE — G0442 ANNUAL ALCOHOL SCREEN 15 MIN: HCPCS | Performed by: INTERNAL MEDICINE

## 2024-04-29 PROCEDURE — 1126F AMNT PAIN NOTED NONE PRSNT: CPT | Performed by: INTERNAL MEDICINE

## 2024-04-29 PROCEDURE — 1159F MED LIST DOCD IN RCRD: CPT | Performed by: INTERNAL MEDICINE

## 2024-04-29 PROCEDURE — 3074F SYST BP LT 130 MM HG: CPT | Performed by: INTERNAL MEDICINE

## 2024-04-29 PROCEDURE — 99214 OFFICE O/P EST MOD 30 MIN: CPT | Performed by: INTERNAL MEDICINE

## 2024-04-29 RX ORDER — ATORVASTATIN CALCIUM 10 MG/1
10 TABLET, FILM COATED ORAL DAILY
Qty: 90 TABLET | Refills: 3 | Status: SHIPPED | OUTPATIENT
Start: 2024-04-29

## 2024-04-29 RX ORDER — ESCITALOPRAM OXALATE 10 MG/1
10 TABLET ORAL DAILY
Qty: 90 TABLET | Refills: 3 | Status: SHIPPED | OUTPATIENT
Start: 2024-04-29

## 2024-04-29 RX ORDER — OXYBUTYNIN CHLORIDE 15 MG/1
15 TABLET, EXTENDED RELEASE ORAL DAILY
Qty: 90 TABLET | Refills: 3 | Status: SHIPPED | OUTPATIENT
Start: 2024-04-29

## 2024-04-29 SDOH — ECONOMIC STABILITY: GENERAL
WHICH OF THE FOLLOWING DO YOU KNOW HOW TO USE AND HAVE ACCESS TO EVERY DAY? (CHOOSE ALL THAT APPLY): SMARTPHONE WITH CELLULAR DATA PLAN;DESKTOP COMPUTER, LAPTOP COMPUTER, OR TABLET WITH BROADBAND INTERNET CONNECTION

## 2024-04-29 SDOH — ECONOMIC STABILITY: INCOME INSECURITY: IN THE LAST 12 MONTHS, WAS THERE A TIME WHEN YOU WERE NOT ABLE TO PAY THE MORTGAGE OR RENT ON TIME?: NO

## 2024-04-29 SDOH — HEALTH STABILITY: PHYSICAL HEALTH: ON AVERAGE, HOW MANY DAYS PER WEEK DO YOU ENGAGE IN MODERATE TO STRENUOUS EXERCISE (LIKE A BRISK WALK)?: 4 DAYS

## 2024-04-29 SDOH — ECONOMIC STABILITY: FOOD INSECURITY: WITHIN THE PAST 12 MONTHS, YOU WORRIED THAT YOUR FOOD WOULD RUN OUT BEFORE YOU GOT MONEY TO BUY MORE.: NEVER TRUE

## 2024-04-29 SDOH — ECONOMIC STABILITY: FOOD INSECURITY: WITHIN THE PAST 12 MONTHS, THE FOOD YOU BOUGHT JUST DIDN'T LAST AND YOU DIDN'T HAVE MONEY TO GET MORE.: NEVER TRUE

## 2024-04-29 SDOH — ECONOMIC STABILITY: HOUSING INSECURITY: IN THE LAST 12 MONTHS, HOW MANY PLACES HAVE YOU LIVED?: 1

## 2024-04-29 SDOH — ECONOMIC STABILITY: GENERAL
WHICH OF THE FOLLOWING WOULD YOU LIKE TO GET CONNECTED TO IN ORDER TO RECEIVE A DISCOUNT OR FOR FREE? (CHOOSE ALL THAT APPLY): NONE OF THESE

## 2024-04-29 SDOH — HEALTH STABILITY: PHYSICAL HEALTH: ON AVERAGE, HOW MANY MINUTES DO YOU ENGAGE IN EXERCISE AT THIS LEVEL?: 60 MIN

## 2024-04-29 ASSESSMENT — ENCOUNTER SYMPTOMS
HEMATURIA: 0
FREQUENCY: 0
FATIGUE: 0
WEAKNESS: 0
CONSTIPATION: 0
LIGHT-HEADEDNESS: 0
SHORTNESS OF BREATH: 0
HEADACHES: 0
DIZZINESS: 0
FEVER: 0
CHILLS: 0
ARTHRALGIAS: 0
NAUSEA: 0
SORE THROAT: 0
DIARRHEA: 0
COUGH: 0
DIFFICULTY URINATING: 0
PALPITATIONS: 0
MYALGIAS: 0
VOMITING: 0
DYSURIA: 0

## 2024-04-29 ASSESSMENT — ACTIVITIES OF DAILY LIVING (ADL)
WALKS IN HOME: INDEPENDENT
NEEDS ASSISTANCE WITH FOOD: INDEPENDENT
USING TRANSPORTATION: INDEPENDENT
PILL BOX USED: NO
USING TELEPHONE: INDEPENDENT
FEEDING YOURSELF: INDEPENDENT
DRESSING YOURSELF: INDEPENDENT
DOING HOUSEWORK: INDEPENDENT
MANAGING FINANCES: INDEPENDENT
JUDGMENT_ADEQUATE_SAFELY_COMPLETE_DAILY_ACTIVITIES: YES
HEARING - RIGHT EAR: FUNCTIONAL
PATIENT'S MEMORY ADEQUATE TO SAFELY COMPLETE DAILY ACTIVITIES?: YES
GROCERY SHOPPING: INDEPENDENT
ASSISTIVE_DEVICE: EYEGLASSES
PREPARING MEALS: INDEPENDENT
EATING: INDEPENDENT
STIL DRIVING: YES
TOILETING: INDEPENDENT
TAKING MEDICATION: INDEPENDENT
HEARING - LEFT EAR: FUNCTIONAL
BATHING: INDEPENDENT
GROOMING: INDEPENDENT
ADEQUATE_TO_COMPLETE_ADL: YES

## 2024-04-29 ASSESSMENT — LIFESTYLE VARIABLES
HOW MANY STANDARD DRINKS CONTAINING ALCOHOL DO YOU HAVE ON A TYPICAL DAY: 1 OR 2
HOW OFTEN DO YOU HAVE A DRINK CONTAINING ALCOHOL: 4 OR MORE TIMES A WEEK
HOW OFTEN DO YOU HAVE SIX OR MORE DRINKS ON ONE OCCASION: NEVER
AUDIT-C TOTAL SCORE: 4
SKIP TO QUESTIONS 9-10: 1

## 2024-04-29 ASSESSMENT — SOCIAL DETERMINANTS OF HEALTH (SDOH)
HOW OFTEN DO YOU GET TOGETHER WITH FRIENDS OR RELATIVES?: MORE THAN THREE TIMES A WEEK
IN THE PAST 12 MONTHS, HAS THE ELECTRIC, GAS, OIL, OR WATER COMPANY THREATENED TO SHUT OFF SERVICE IN YOUR HOME?: NO
IN A TYPICAL WEEK, HOW MANY TIMES DO YOU TALK ON THE PHONE WITH FAMILY, FRIENDS, OR NEIGHBORS?: MORE THAN THREE TIMES A WEEK
DO YOU BELONG TO ANY CLUBS OR ORGANIZATIONS SUCH AS CHURCH GROUPS UNIONS, FRATERNAL OR ATHLETIC GROUPS, OR SCHOOL GROUPS?: YES
HOW HARD IS IT FOR YOU TO PAY FOR THE VERY BASICS LIKE FOOD, HOUSING, MEDICAL CARE, AND HEATING?: NOT HARD AT ALL
HOW OFTEN DO YOU ATTENT MEETINGS OF THE CLUB OR ORGANIZATION YOU BELONG TO?: MORE THAN 4 TIMES PER YEAR
HOW OFTEN DO YOU ATTEND CHURCH OR RELIGIOUS SERVICES?: NEVER

## 2024-04-29 ASSESSMENT — PAIN SCALES - GENERAL: PAINLEVEL: 0-NO PAIN

## 2024-04-29 NOTE — ASSESSMENT & PLAN NOTE
Alcohol screening showed some misuse of alcohol she is drinking 2 rocío a day.  Lifestyle modification including cutting back on the alcohol to 1/day was encouraged to the patient.  She was counseled about alcohol risk as well for approximately 6 minutes

## 2024-04-29 NOTE — ASSESSMENT & PLAN NOTE
Chronic renal insufficiency 3A, patient was reminded to avoid anti-inflammatory drugs or NSAIDs and to drink more liquids.  Specifically more water would be helpful for this patient who appears to be slightly dehydrated on her blood work.

## 2024-04-29 NOTE — ASSESSMENT & PLAN NOTE
Patient has noticed some increased heartburn for the last 2 months.  She typically drinks water or eats something to make it better and it seems to be worse when she first lays down at night.  I have recommended Prilosec OTC 20 mg daily for at least 6 weeks and if symptoms are improving she can stop the medication.  If for some reason reflux symptoms are worsening or not improving she is to call back and or follow-up with GI.

## 2024-04-29 NOTE — PROGRESS NOTES
Subjective   Reason for Visit: Shona Garduno is an 79 y.o. female here for a Medicare Wellness visit.     Past Medical, Surgical, and Family History reviewed and updated in chart.    Reviewed all medications by prescribing practitioner or clinical pharmacist (such as prescriptions, OTCs, herbal therapies and supplements) and documented in the medical record.    Patient is here for annual wellness visit as well as management of her cholesterol, hypertension and osteoporosis urinary incontinence and depression.  Patient wondered if she was a candidate for medication such as Ozempic for weight loss.  I told her we generally indicated them for patients with a BMI of 30 or higher and she did not qualify.  I did encourage her however to follow-up with [facility] as it was a good weight loss clinic could help her.  She was also offered a nutrition referral which she declined    Coronary risk screening was reviewed with the patient today in addition to other activities.  Her ASCVD risk is currently 24.7% which is substantially elevated.  We discussed lifestyle modifications including increasing her exercise which is limited, continuing her current medications like atorvastatin and baby aspirin etc.  Patient was counseled about alcohol misuse as well.  She currently drinks 2 glasses of wine every day.  I do believe she would benefit from cutting that down to 1/day for both alcohol purposes as well as her cholesterol.  Patient was counseled approximately 6 or 7 minutes regarding alcohol misuse although she does not addicted.    We do have a living will and power of  for the patient.  Her  Nain's name first second is her son Je.  At this time she would like to remain a full code        Patient Care Team:  Claudine Perez MD as PCP - General  Claudine Perez MD as PCP - [employer] PCP     Review of Systems   Constitutional:  Negative for chills, fatigue and fever.  "  HENT:  Negative for sore throat.    Eyes:  Negative for visual disturbance.   Respiratory:  Negative for cough and shortness of breath.    Cardiovascular:  Negative for chest pain, palpitations and leg swelling.   Gastrointestinal:  Negative for constipation, diarrhea, nausea and vomiting.   Genitourinary:  Negative for difficulty urinating, dysuria, frequency, hematuria and urgency.   Musculoskeletal:  Negative for arthralgias and myalgias.   Skin:  Negative for rash.   Neurological:  Negative for dizziness, syncope, weakness, light-headedness and headaches.       Objective   Vitals:  /70 (BP Location: Left arm, Patient Position: Sitting)   Pulse 83   Ht 1.613 m (5' 3.5\") Comment: w/o shoes on  Wt 76.2 kg (168 lb)   SpO2 95%   BMI 29.29 kg/m²       Physical Exam  Constitutional:       Appearance: Normal appearance.   HENT:      Head: Normocephalic and atraumatic.      Nose: Nose normal.   Eyes:      Extraocular Movements: Extraocular movements intact.      Pupils: Pupils are equal, round, and reactive to light.   Cardiovascular:      Rate and Rhythm: Normal rate and regular rhythm.   Pulmonary:      Breath sounds: Normal breath sounds.   Abdominal:      General: Abdomen is flat. Bowel sounds are normal.      Palpations: Abdomen is soft.   Musculoskeletal:      Right lower leg: No edema.      Left lower leg: No edema.   Neurological:      Mental Status: She is alert.         Assessment/Plan   Problem List Items Addressed This Visit       Chronic renal failure, stage 3a (Multi)    Current Assessment & Plan     Chronic renal insufficiency 3A, patient was reminded to avoid anti-inflammatory drugs or NSAIDs and to drink more liquids.  Specifically more water would be helpful for this patient who appears to be slightly dehydrated on her blood work.         Current moderate episode of major depressive disorder (Multi)    Current Assessment & Plan     Depression is stable with the escitalopram.         " Relevant Medications    escitalopram (Lexapro) 10 mg tablet    GERD (gastroesophageal reflux disease)    Current Assessment & Plan     Patient has noticed some increased heartburn for the last 2 months.  She typically drinks water or eats something to make it better and it seems to be worse when she first lays down at night.  I have recommended Prilosec OTC 20 mg daily for at least 6 weeks and if symptoms are improving she can stop the medication.  If for some reason reflux symptoms are worsening or not improving she is to call back and or follow-up with GI.         Hypercholesterolemia    Current Assessment & Plan     Patient is tolerating atorvastatin well with no muscle aches.  Liver enzymes remain normal and were just reviewed.         Relevant Medications    atorvastatin (Lipitor) 10 mg tablet    Hypertension    Current Assessment & Plan     Hypertension is stable and well-controlled.         Osteoporosis    Current Assessment & Plan     Patient is remaining on calcium, vitamin D and her bone density was just completed in 2023 and is not due until next year         Urinary incontinence    Current Assessment & Plan     Incontinence is stable with oxybutynin 15 mg daily         Relevant Medications    oxybutynin XL (Ditropan-XL) 15 mg 24 hr tablet    Wellness examination    Current Assessment & Plan     Annual wellness visit completed today.  Safety issues have already been addressed and there are no outstanding issues.  Living will and power of  are already on record.  CODE STATUS was reviewed and she will stay full code    Patient is due for mammogram in September.  Patient is due for colonoscopy and she was encouraged to follow-up with Dr. Michelle  ASCVD risk is currently 24.7% which was reviewed with her in depth today and lifestyle modifications recommended             Alcohol screening    Current Assessment & Plan     Alcohol screening showed some misuse of alcohol she is drinking 2 rocío a day.   Lifestyle modification including cutting back on the alcohol to 1/day was encouraged to the patient.  She was counseled about alcohol risk as well for approximately 6 minutes         Screening for multiple conditions    Current Assessment & Plan     Depression screen was negative.  Patient is actively has depression which is being treated and appears to be that the treatment is working well.    Alcohol screen did show some alcohol misuse says she is drinking 2 rocío per day.  Patient was counseled encouraged to cut back on this although she is not addicted.         Full code status    Current Assessment & Plan     CODE STATUS was discussed with the patient today and they wish to be a full code.  Patient understands that this may include CPR, cardioversion, intubation and ventilation if necessary.    Living will and power of  are on record.  She has named her  Nain Garduno as her power of  initially and the second would be her son Je.           Cardiac risk counseling    Current Assessment & Plan     Cardiovascular risk discussed and, if needed, lifestyle modifications recommended, including nutritional choices, exercise, and elimination of habits contributing to risk.  We agreed on a plan to reduce the current cardiovascular risk.  Aspirin use/disuse was discussed after reviewing updated guidelines    Patient is already on atorvastatin and baby aspirin daily.  She is encouraged to work on both diet, weight loss and especially increasing exercise activities.          Other Visit Diagnoses       Routine general medical examination at health care facility    -  Primary        Labs were reviewed with the patient in person today.  Follow-up with me will be in 4 months  Mammogram due September

## 2024-04-29 NOTE — ASSESSMENT & PLAN NOTE
Patient is tolerating atorvastatin well with no muscle aches.  Liver enzymes remain normal and were just reviewed.

## 2024-04-29 NOTE — ASSESSMENT & PLAN NOTE
Depression screen was negative.  Patient is actively has depression which is being treated and appears to be that the treatment is working well.    Alcohol screen did show some alcohol misuse says she is drinking 2 rocío per day.  Patient was counseled encouraged to cut back on this although she is not addicted.

## 2024-04-29 NOTE — PATIENT INSTRUCTIONS
Try 20mg of omeprazole every day for 6 weeks    Please get RSV vaccine called Arexvy    Follow up Dr Perez in 4 months for HTN

## 2024-04-29 NOTE — ASSESSMENT & PLAN NOTE
Annual wellness visit completed today.  Safety issues have already been addressed and there are no outstanding issues.  Living will and power of  are already on record.  CODE STATUS was reviewed and she will stay full code    Patient is due for mammogram in September.  Patient is due for colonoscopy and she was encouraged to follow-up with Dr. Michelle  ASCVD risk is currently 24.7% which was reviewed with her in depth today and lifestyle modifications recommended

## 2024-04-29 NOTE — ASSESSMENT & PLAN NOTE
Patient is remaining on calcium, vitamin D and her bone density was just completed in 2023 and is not due until next year

## 2024-04-29 NOTE — ASSESSMENT & PLAN NOTE
Cardiovascular risk discussed and, if needed, lifestyle modifications recommended, including nutritional choices, exercise, and elimination of habits contributing to risk.  We agreed on a plan to reduce the current cardiovascular risk.  Aspirin use/disuse was discussed after reviewing updated guidelines    Patient is already on atorvastatin and baby aspirin daily.  She is encouraged to work on both diet, weight loss and especially increasing exercise activities.

## 2024-04-29 NOTE — ASSESSMENT & PLAN NOTE
CODE STATUS was discussed with the patient today and they wish to be a full code.  Patient understands that this may include CPR, cardioversion, intubation and ventilation if necessary.    Living will and power of  are on record.  She has named her  Nain Garduno as her power of  initially and the second would be her son Je.

## 2024-04-30 LAB — 1,25(OH)2D3 SERPL-MCNC: 58.8 PG/ML (ref 19.9–79.3)

## 2024-05-01 ENCOUNTER — TELEPHONE (OUTPATIENT)
Dept: PRIMARY CARE | Facility: CLINIC | Age: 79
End: 2024-05-01
Payer: MEDICARE

## 2024-05-01 NOTE — TELEPHONE ENCOUNTER
----- Message from Claudine Perez MD sent at 5/1/2024  8:17 AM EDT -----  Patient was just here and forgot to discuss the Prolia with her.  Please call the patient and see if she is willing to restart the Prolia at this time?  ----- Message -----  From: Stephanie Scott CMA  Sent: 4/30/2024   9:07 AM EDT  To: Claudine Perez MD    Does she want to restart Prolia or not at this time?  ----- Message -----  From: Claudine Perze MD  Sent: 4/26/2024  12:00 AM EDT  To: Stephanie Scott CMA      ----- Message -----  From: Stephanie Scott CMA  Sent: 3/27/2024   9:54 AM EDT  To: Claudine Perez MD    Discuss restarting Prolia when she comes in on 4/29/24. Please let Stephanie know outcome.

## 2024-05-01 NOTE — TELEPHONE ENCOUNTER
Patient returned call.   Does not want to restart Prolia at this time. She will discuss at her next appointment.    Shona [phone]

## 2024-08-26 ENCOUNTER — APPOINTMENT (OUTPATIENT)
Dept: PRIMARY CARE | Facility: CLINIC | Age: 79
End: 2024-08-26
Payer: MEDICARE

## 2024-08-26 VITALS
WEIGHT: 167.2 LBS | HEIGHT: 64 IN | DIASTOLIC BLOOD PRESSURE: 77 MMHG | HEART RATE: 88 BPM | SYSTOLIC BLOOD PRESSURE: 121 MMHG | BODY MASS INDEX: 28.54 KG/M2 | OXYGEN SATURATION: 92 %

## 2024-08-26 DIAGNOSIS — F32.1 CURRENT MODERATE EPISODE OF MAJOR DEPRESSIVE DISORDER WITHOUT PRIOR EPISODE (MULTI): ICD-10-CM

## 2024-08-26 DIAGNOSIS — I10 PRIMARY HYPERTENSION: ICD-10-CM

## 2024-08-26 DIAGNOSIS — N18.31 CHRONIC RENAL FAILURE, STAGE 3A (MULTI): ICD-10-CM

## 2024-08-26 DIAGNOSIS — M81.0 AGE-RELATED OSTEOPOROSIS WITHOUT CURRENT PATHOLOGICAL FRACTURE: ICD-10-CM

## 2024-08-26 DIAGNOSIS — K21.9 GASTROESOPHAGEAL REFLUX DISEASE WITHOUT ESOPHAGITIS: ICD-10-CM

## 2024-08-26 DIAGNOSIS — E78.00 HYPERCHOLESTEROLEMIA: Primary | ICD-10-CM

## 2024-08-26 PROCEDURE — 1036F TOBACCO NON-USER: CPT | Performed by: INTERNAL MEDICINE

## 2024-08-26 PROCEDURE — 1160F RVW MEDS BY RX/DR IN RCRD: CPT | Performed by: INTERNAL MEDICINE

## 2024-08-26 PROCEDURE — 99214 OFFICE O/P EST MOD 30 MIN: CPT | Performed by: INTERNAL MEDICINE

## 2024-08-26 PROCEDURE — G2211 COMPLEX E/M VISIT ADD ON: HCPCS | Performed by: INTERNAL MEDICINE

## 2024-08-26 PROCEDURE — 1159F MED LIST DOCD IN RCRD: CPT | Performed by: INTERNAL MEDICINE

## 2024-08-26 PROCEDURE — 3078F DIAST BP <80 MM HG: CPT | Performed by: INTERNAL MEDICINE

## 2024-08-26 PROCEDURE — 1126F AMNT PAIN NOTED NONE PRSNT: CPT | Performed by: INTERNAL MEDICINE

## 2024-08-26 PROCEDURE — 1157F ADVNC CARE PLAN IN RCRD: CPT | Performed by: INTERNAL MEDICINE

## 2024-08-26 PROCEDURE — 3074F SYST BP LT 130 MM HG: CPT | Performed by: INTERNAL MEDICINE

## 2024-08-26 ASSESSMENT — ENCOUNTER SYMPTOMS
NAUSEA: 0
DIZZINESS: 0
FREQUENCY: 0
CONSTIPATION: 0
SHORTNESS OF BREATH: 0
ARTHRALGIAS: 0
FATIGUE: 0
LIGHT-HEADEDNESS: 0
SORE THROAT: 0
DYSURIA: 0
PALPITATIONS: 0
COUGH: 0
FEVER: 0
ABDOMINAL PAIN: 0
VOMITING: 0
DIARRHEA: 0
TROUBLE SWALLOWING: 0

## 2024-08-26 ASSESSMENT — PAIN SCALES - GENERAL: PAINLEVEL: 0-NO PAIN

## 2024-08-26 ASSESSMENT — PATIENT HEALTH QUESTIONNAIRE - PHQ9
SUM OF ALL RESPONSES TO PHQ9 QUESTIONS 1 AND 2: 0
1. LITTLE INTEREST OR PLEASURE IN DOING THINGS: NOT AT ALL
2. FEELING DOWN, DEPRESSED OR HOPELESS: NOT AT ALL

## 2024-08-26 NOTE — ASSESSMENT & PLAN NOTE
Patient remains on Prolia, calcium and vitamin D for her osteoporosis.  Next bone density is due in August 2025

## 2024-08-26 NOTE — PROGRESS NOTES
Subjective   Patient ID: Shona Garduno is a 79 y.o. female who presents for 4 month follow up for HTN management.    Patient is here for 4-month follow-up for her hypertension cholesterol osteoporosis and renal disease.  Patient was post to get a mammogram in the spring but did not so she was reminded to try and get it now before she leaves for [place].  Patient says she believes she will be here through [place] but leaving for [place] shortly thereafter.        Review of Systems   Constitutional:  Negative for fatigue and fever.   HENT:  Negative for sore throat and trouble swallowing.    Eyes:  Negative for visual disturbance.   Respiratory:  Negative for cough and shortness of breath.    Cardiovascular:  Negative for chest pain, palpitations and leg swelling.   Gastrointestinal:  Negative for abdominal pain, constipation, diarrhea, nausea and vomiting.   Genitourinary:  Negative for dysuria and frequency.   Musculoskeletal:  Negative for arthralgias.   Skin:  Negative for rash.   Neurological:  Negative for dizziness and light-headedness.       Objective   Medication Documentation Review Audit       Reviewed by Claudine Perez MD (Physician) on 08/26/24 at 1123      Medication Order Taking? Sig Documenting Provider Last Dose Status   aspirin 81 mg chewable tablet [id number]  Chew 1 tablet (81 mg) once daily. Historical Provider, MD  Active   atorvastatin (Lipitor) 10 mg tablet [id number]  Take 1 tablet (10 mg) by mouth once daily. Claudine Perez MD  Active   azithromycin (Zithromax) 250 mg tablet [id number]  1 tablet (250 mg). Historical Provider, MD  Active   Bifidobacterium infantis (Align) 4 mg capsule [id number]  Take by mouth. Historical Provider, MD  Active   calcium carbonate (CALTRATE 600 ORAL) [id number]  Caltrate Historical Provider, MD  Active   CALCIUM CITRATE-VITAMIN D3 ORAL [id number]  Take by mouth. Historical Provider, MD  Active   chlorhexidine (Peridex) 0.12 % solution [id number]  SWISH AND SPIT 15  MLS UNDILUTED SOLN TWICE DAILY ONE WEEK BEFORE AND 1 WEEK AFTER PROCEDURE Historical Provider, MD  Active   clobetasoL 0.05 % external spray [id number]  twice a day. Historical Provider, MD  Active   denosumab (Prolia) 60 mg/mL syringe [id number]  Inject 1 mL (60 mg) under the skin. Historical Provider, MD  Active   escitalopram (Lexapro) 10 mg tablet [id number]  Take 1 tablet (10 mg) by mouth once daily. Claudine Perez MD  Active   halobetasol (UltraVATE) 0.05 % ointment [id number]  twice a day. Historical Provider, MD  Active   ketorolac (Acular) 0.5 % ophthalmic solution [id number]  Administer 1 drop into the left eye. Historical Provider, MD  Active   magnesium L-threon-niacinamide 42 mg (500 mg)- 250 mg tablet extended release [id number]  magnesium Historical Provider, MD  Active   multivitamin tablet [id number]  Take by mouth. Historical Provider, MD  Active   oxybutynin XL (Ditropan-XL) 15 mg 24 hr tablet [id number]  Take 1 tablet (15 mg) by mouth once daily. Claudine Perez MD  Active   prenat75-iron fum-folic ac-om3 (One A Day Women's Prenatal DHA) 28 mg iron- 800 mcg combo pack [id number]  One A Day Women's Prenatal DHA Historical Provider, MD  Active   pyridoxine (Vitamin B-6) 100 mg tablet [id number]  Take 1 tablet (100 mg) by mouth. Historical Provider, MD  Active   valsartan (Diovan) 80 mg tablet [id number]  Take 1 tablet (80 mg) by mouth once daily. Claudine Perez MD  Active   vit A,C and E-lutein-minerals (Ocuvite with Lutein) 300 mcg-200 mg-27 mg-2 mg tablet [id number]  Take by mouth. Historical Provider, MD  Active   vitamin B complex vit C no.4 (SUPER B COMPLEX + C ORAL) [id number]  Super B Complex + C Historical Provider, MD  Active   vitamin E acid succinate (vitamin E succinate) 67 mg (100 unit) tablet [id number]  Take by mouth once daily. Historical Provider, MD  Active   vitamin E mixed 400 unit capsule [id number]  Take by oral route. Historical Provider, MD  Active                  Allergies   Allergen Reactions     "Acetaminophen Hives    Erythromycin Unknown    Penicillins Rash       /77   Pulse 88   Ht 1.613 m (5' 3.5\")   Wt 75.8 kg (167 lb 3.2 oz)   SpO2 92%   BMI 29.15 kg/m²     Physical Exam  Constitutional:       Appearance: Normal appearance.   HENT:      Head: Normocephalic and atraumatic.      Nose: Nose normal.   Eyes:      Extraocular Movements: Extraocular movements intact.      Pupils: Pupils are equal, round, and reactive to light.   Cardiovascular:      Rate and Rhythm: Normal rate and regular rhythm.   Pulmonary:      Breath sounds: Normal breath sounds.   Abdominal:      General: Abdomen is flat. Bowel sounds are normal.      Palpations: Abdomen is soft.   Musculoskeletal:      Right lower leg: No edema.      Left lower leg: No edema.   Neurological:      Mental Status: She is alert.           Assessment/Plan   Problem List Items Addressed This Visit       Chronic renal failure, stage 3a (Multi)    Current moderate episode of major depressive disorder (Multi)     Patient notes that her mood and depression are stable on the Lexapro or Escitalopram 10 mg daily.         GERD (gastroesophageal reflux disease)    Hypercholesterolemia - Primary     Patient's cholesterol stable on a atorvastatin 10 mg daily.  Annual blood work was completed in April and liver enzymes remain stable         Hypertension     Hypertension is stable and well-controlled.         Osteoporosis     Patient remains on Prolia, calcium and vitamin D for her osteoporosis.  Next bone density is due in August 2025                   It has been a pleasure seeing you.  Claudine Perez MD    "

## 2024-08-26 NOTE — ASSESSMENT & PLAN NOTE
Patient's cholesterol stable on a atorvastatin 10 mg daily.  Annual blood work was completed in April and liver enzymes remain stable

## 2024-10-03 ENCOUNTER — HOSPITAL ENCOUNTER (OUTPATIENT)
Dept: RADIOLOGY | Facility: CLINIC | Age: 79
Discharge: HOME | End: 2024-10-03
Payer: MEDICARE

## 2024-10-03 VITALS — HEIGHT: 64 IN | BODY MASS INDEX: 28.17 KG/M2 | WEIGHT: 165 LBS

## 2024-10-03 DIAGNOSIS — Z12.31 SCREENING MAMMOGRAM, ENCOUNTER FOR: ICD-10-CM

## 2024-10-03 PROCEDURE — 77067 SCR MAMMO BI INCL CAD: CPT

## 2024-12-16 ENCOUNTER — APPOINTMENT (OUTPATIENT)
Dept: PRIMARY CARE | Facility: CLINIC | Age: 79
End: 2024-12-16
Payer: MEDICARE

## 2024-12-16 VITALS
DIASTOLIC BLOOD PRESSURE: 70 MMHG | HEIGHT: 64 IN | HEART RATE: 80 BPM | OXYGEN SATURATION: 98 % | BODY MASS INDEX: 29.12 KG/M2 | WEIGHT: 170.6 LBS | SYSTOLIC BLOOD PRESSURE: 122 MMHG

## 2024-12-16 DIAGNOSIS — K21.9 GASTROESOPHAGEAL REFLUX DISEASE WITHOUT ESOPHAGITIS: ICD-10-CM

## 2024-12-16 DIAGNOSIS — I10 PRIMARY HYPERTENSION: ICD-10-CM

## 2024-12-16 DIAGNOSIS — M81.0 AGE-RELATED OSTEOPOROSIS WITHOUT CURRENT PATHOLOGICAL FRACTURE: ICD-10-CM

## 2024-12-16 DIAGNOSIS — N18.31 CHRONIC RENAL FAILURE, STAGE 3A (MULTI): Primary | ICD-10-CM

## 2024-12-16 DIAGNOSIS — E78.00 HYPERCHOLESTEROLEMIA: ICD-10-CM

## 2024-12-16 DIAGNOSIS — F32.1 CURRENT MODERATE EPISODE OF MAJOR DEPRESSIVE DISORDER WITHOUT PRIOR EPISODE (MULTI): ICD-10-CM

## 2024-12-16 PROBLEM — Z13.89 SCREENING FOR MULTIPLE CONDITIONS: Status: RESOLVED | Noted: 2023-04-26 | Resolved: 2024-12-16

## 2024-12-16 PROCEDURE — 1159F MED LIST DOCD IN RCRD: CPT | Performed by: INTERNAL MEDICINE

## 2024-12-16 PROCEDURE — 1126F AMNT PAIN NOTED NONE PRSNT: CPT | Performed by: INTERNAL MEDICINE

## 2024-12-16 PROCEDURE — 1157F ADVNC CARE PLAN IN RCRD: CPT | Performed by: INTERNAL MEDICINE

## 2024-12-16 PROCEDURE — 3078F DIAST BP <80 MM HG: CPT | Performed by: INTERNAL MEDICINE

## 2024-12-16 PROCEDURE — 1160F RVW MEDS BY RX/DR IN RCRD: CPT | Performed by: INTERNAL MEDICINE

## 2024-12-16 PROCEDURE — 3074F SYST BP LT 130 MM HG: CPT | Performed by: INTERNAL MEDICINE

## 2024-12-16 PROCEDURE — 1036F TOBACCO NON-USER: CPT | Performed by: INTERNAL MEDICINE

## 2024-12-16 PROCEDURE — 99214 OFFICE O/P EST MOD 30 MIN: CPT | Performed by: INTERNAL MEDICINE

## 2024-12-16 PROCEDURE — G2211 COMPLEX E/M VISIT ADD ON: HCPCS | Performed by: INTERNAL MEDICINE

## 2024-12-16 RX ORDER — VALSARTAN 80 MG/1
80 TABLET ORAL DAILY
Qty: 90 TABLET | Refills: 3 | Status: SHIPPED | OUTPATIENT
Start: 2024-12-16

## 2024-12-16 ASSESSMENT — ENCOUNTER SYMPTOMS
TROUBLE SWALLOWING: 0
NAUSEA: 0
FATIGUE: 0
LIGHT-HEADEDNESS: 0
FREQUENCY: 0
SHORTNESS OF BREATH: 0
VOMITING: 0
DYSURIA: 0
COUGH: 0
ARTHRALGIAS: 0
SORE THROAT: 0
DIARRHEA: 0
PALPITATIONS: 0
FEVER: 0
ABDOMINAL PAIN: 0
DIZZINESS: 0
CONSTIPATION: 0

## 2024-12-16 ASSESSMENT — PAIN SCALES - GENERAL: PAINLEVEL_OUTOF10: 0-NO PAIN

## 2024-12-16 NOTE — ASSESSMENT & PLAN NOTE
Initial blood pressure is mildly elevated but recheck after sitting was normal at 122/70.  Patient was given a refill on her valsartan and will continue her current medication regiment.

## 2024-12-16 NOTE — PROGRESS NOTES
Subjective   Patient ID: Shona Garduno is a 79 y.o. female who presents for 4 month follow up for GERD, HTN, and cholesterol management.    Patient is here for routine follow-up for her hypertension high cholesterol osteoporosis and reflux symptoms.  She has noticed a slight cough at night.  It feels like she just has congestion and drainage but only seems to occur at night.  She does occasionally take Benadryl to help her sleep but has not noticed that it has changed the cough at all.  This has been ongoing for at least the last several weeks but but does not happen all the time.  She denies fevers chills or any other complaints and she is not coughing during the day        Review of Systems   Constitutional:  Negative for fatigue and fever.   HENT:  Negative for sore throat and trouble swallowing.    Eyes:  Negative for visual disturbance.   Respiratory:  Negative for cough and shortness of breath.    Cardiovascular:  Negative for chest pain, palpitations and leg swelling.   Gastrointestinal:  Negative for abdominal pain, constipation, diarrhea, nausea and vomiting.   Genitourinary:  Negative for dysuria and frequency.   Musculoskeletal:  Negative for arthralgias.   Skin:  Negative for rash.   Neurological:  Negative for dizziness and light-headedness.       Objective   Medication Documentation Review Audit       Reviewed by Claudine Perez MD (Physician) on 12/16/24 at 1045      Medication Order Taking? Sig Documenting Provider Last Dose Status   aspirin 81 mg chewable tablet [id number]  Chew 1 tablet (81 mg) once daily. Historical Provider, MD  Active   atorvastatin (Lipitor) 10 mg tablet [id number]  Take 1 tablet (10 mg) by mouth once daily. Claudine Preez MD  Active   azithromycin (Zithromax) 250 mg tablet [id number]  1 tablet (250 mg). Historical Provider, MD  Active   Bifidobacterium infantis (Align) 4 mg capsule [id number]  Take by mouth. Historical Provider, MD  Active   calcium carbonate (CALTRATE 600  ORAL) [id number]  Caltrate Historical Provider, MD  Active   CALCIUM CITRATE-VITAMIN D3 ORAL [id number]  Take by mouth. Historical Provider, MD  Active   chlorhexidine (Peridex) 0.12 % solution [id number]  SWISH AND SPIT 15 MLS UNDILUTED SOLN TWICE DAILY ONE WEEK BEFORE AND 1 WEEK AFTER PROCEDURE Historical Provider, MD  Active   clobetasoL 0.05 % external spray [id number]  twice a day. Historical Provider, MD  Active   denosumab (Prolia) 60 mg/mL syringe [id number]  Inject 1 mL (60 mg) under the skin. Historical Provider, MD  Active   escitalopram (Lexapro) 10 mg tablet [id number]  Take 1 tablet (10 mg) by mouth once daily. Claudine Perez MD  Active   halobetasol (UltraVATE) 0.05 % ointment [id number]  twice a day. Historical Provider, MD  Active   ketorolac (Acular) 0.5 % ophthalmic solution [id number]  Administer 1 drop into the left eye. Historical Provider, MD  Active   magnesium L-threon-niacinamide 42 mg (500 mg)- 250 mg tablet extended release [id number]  magnesium Historical Provider, MD  Active   multivitamin tablet [id number]  Take by mouth. Historical Provider, MD  Active   oxybutynin XL (Ditropan-XL) 15 mg 24 hr tablet [id number]  Take 1 tablet (15 mg) by mouth once daily. Claudine Perez MD  Active   prenat75-iron fum-folic ac-om3 (One A Day Women's Prenatal DHA) 28 mg iron- 800 mcg combo pack [id number]  One A Day Women's Prenatal DHA Historical Provider, MD  Active   pyridoxine (Vitamin B-6) 100 mg tablet [id number]  Take 1 tablet (100 mg) by mouth. Historical Provider, MD  Active   valsartan (Diovan) 80 mg tablet [id number]  Take 1 tablet (80 mg) by mouth once daily. Claudine Perez MD  Active   vit A,C and E-lutein-minerals (Ocuvite with Lutein) 300 mcg-200 mg-27 mg-2 mg tablet [id number]  Take by mouth. Historical Provider, MD  Active   vitamin B complex vit C no.4 (SUPER B COMPLEX + C ORAL) [id number]  Super B Complex + C Historical Provider, MD  Active   vitamin E acid succinate (vitamin E succinate) 67 mg (100 unit)  "tablet [id number]  Take by mouth once daily. Historical Provider, MD  Active   vitamin E mixed 400 unit capsule [id number]  Take by oral route. Historical Provider, MD  Active                  Allergies   Allergen Reactions    Acetaminophen Hives    Erythromycin Unknown    Penicillins Rash       /70   Pulse 80   Ht 1.613 m (5' 3.5\")   Wt 77.4 kg (170 lb 9.6 oz)   SpO2 98%   BMI 29.75 kg/m²     Physical Exam  Constitutional:       Appearance: Normal appearance.   HENT:      Head: Normocephalic and atraumatic.      Nose: Nose normal.   Eyes:      Extraocular Movements: Extraocular movements intact.      Pupils: Pupils are equal, round, and reactive to light.   Cardiovascular:      Rate and Rhythm: Normal rate and regular rhythm.   Pulmonary:      Breath sounds: Normal breath sounds.   Abdominal:      General: Abdomen is flat. Bowel sounds are normal.      Palpations: Abdomen is soft.   Musculoskeletal:      Right lower leg: No edema.      Left lower leg: No edema.   Neurological:      Mental Status: She is alert.           Assessment/Plan   Problem List Items Addressed This Visit       Chronic renal failure, stage 3a (Multi) - Primary    Relevant Orders    TSH with reflex to Free T4 if abnormal    Vitamin D 25-Hydroxy,Total (for eval of Vitamin D levels)    Lipid Panel    Comprehensive Metabolic Panel    CBC    Current moderate episode of major depressive disorder (Multi)     Depression and mood are stable on the escitalopram or Lexapro 10 mg daily.         GERD (gastroesophageal reflux disease)     Reflux symptoms are stable.         Relevant Orders    TSH with reflex to Free T4 if abnormal    Vitamin D 25-Hydroxy,Total (for eval of Vitamin D levels)    Lipid Panel    Comprehensive Metabolic Panel    CBC    Hypercholesterolemia     Patient remains on atorvastatin 10 mg daily and will check lipid profile and liver enzymes in April when she returns from [place].         Relevant Orders    TSH with reflex to Free " T4 if abnormal    Vitamin D 25-Hydroxy,Total (for eval of Vitamin D levels)    Lipid Panel    Comprehensive Metabolic Panel    CBC    Hypertension     Initial blood pressure is mildly elevated but recheck after sitting was normal at 122/70.  Patient was given a refill on her valsartan and will continue her current medication regiment.         Relevant Medications    valsartan (Diovan) 80 mg tablet    Other Relevant Orders    TSH with reflex to Free T4 if abnormal    Vitamin D 25-Hydroxy,Total (for eval of Vitamin D levels)    Lipid Panel    Comprehensive Metabolic Panel    CBC    Osteoporosis     Patient completed her bone density in August 2023 and is not due until next year.  She is on calcium vitamin D and Prolia         Relevant Orders    TSH with reflex to Free T4 if abnormal    Vitamin D 25-Hydroxy,Total (for eval of Vitamin D levels)    Lipid Panel    Comprehensive Metabolic Panel    CBC       Patient is overdue for her colonoscopy and is encouraged to follow-up with Dr. Michelle to get one next year  Patient will follow-up with me in April with a wellness visit and check annual blood work prior to that appointment.    Regarding the patient's congestion at night and cough I recommended Claritin every day for 2 weeks straight.  If it is not improving her symptoms she should then add Mucinex 600 mg twice a day to her regimen.  If she feels short of breath cough becomes productive or something changes she is to call let us know we would consider checking a chest x-ray.  Lung fields were clear today       It has been a pleasure seeing you.  Claudine Perez MD

## 2024-12-16 NOTE — ASSESSMENT & PLAN NOTE
Patient remains on atorvastatin 10 mg daily and will check lipid profile and liver enzymes in April when she returns from [place].

## 2024-12-16 NOTE — PATIENT INSTRUCTIONS
Please follow up with Dr Michelle for colonoscopy    Follow up Dr Perez in April with 45 min wellness exam    Get fasting labs before April appointment

## 2024-12-16 NOTE — ASSESSMENT & PLAN NOTE
Patient completed her bone density in August 2023 and is not due until next year.  She is on calcium vitamin D and Prolia

## 2025-04-22 DIAGNOSIS — E78.00 HYPERCHOLESTEROLEMIA: ICD-10-CM

## 2025-04-22 DIAGNOSIS — F32.1 CURRENT MODERATE EPISODE OF MAJOR DEPRESSIVE DISORDER WITHOUT PRIOR EPISODE (MULTI): ICD-10-CM

## 2025-04-22 DIAGNOSIS — R32 URINARY INCONTINENCE, UNSPECIFIED TYPE: ICD-10-CM

## 2025-04-22 RX ORDER — OXYBUTYNIN CHLORIDE 15 MG/1
15 TABLET, EXTENDED RELEASE ORAL DAILY
Qty: 90 TABLET | Refills: 3 | Status: SHIPPED | OUTPATIENT
Start: 2025-04-22 | End: 2025-04-23 | Stop reason: SDUPTHER

## 2025-04-22 RX ORDER — ATORVASTATIN CALCIUM 10 MG/1
10 TABLET, FILM COATED ORAL DAILY
Qty: 90 TABLET | Refills: 3 | Status: SHIPPED | OUTPATIENT
Start: 2025-04-22 | End: 2025-04-23 | Stop reason: SDUPTHER

## 2025-04-22 RX ORDER — ESCITALOPRAM OXALATE 10 MG/1
10 TABLET ORAL DAILY
Qty: 90 TABLET | Refills: 3 | Status: SHIPPED | OUTPATIENT
Start: 2025-04-22 | End: 2025-04-23 | Stop reason: SDUPTHER

## 2025-04-22 NOTE — TELEPHONE ENCOUNTER
Refill      escitalopram (Lexapro) 10 mg tablet   oxybutynin XL (Ditropan-XL) 15 mg 24 hr tablet     atorvastatin (Lipitor) 10 mg tablet     [facility]

## 2025-04-23 ENCOUNTER — APPOINTMENT (OUTPATIENT)
Dept: PRIMARY CARE | Facility: CLINIC | Age: 80
End: 2025-04-23
Payer: MEDICARE

## 2025-04-23 VITALS
BODY MASS INDEX: 29.47 KG/M2 | HEIGHT: 64 IN | DIASTOLIC BLOOD PRESSURE: 73 MMHG | SYSTOLIC BLOOD PRESSURE: 120 MMHG | OXYGEN SATURATION: 94 % | WEIGHT: 172.6 LBS | HEART RATE: 83 BPM

## 2025-04-23 DIAGNOSIS — Z78.9 FULL CODE STATUS: ICD-10-CM

## 2025-04-23 DIAGNOSIS — Z71.89 ACP (ADVANCE CARE PLANNING): ICD-10-CM

## 2025-04-23 DIAGNOSIS — M81.0 AGE-RELATED OSTEOPOROSIS WITHOUT CURRENT PATHOLOGICAL FRACTURE: ICD-10-CM

## 2025-04-23 DIAGNOSIS — R79.89 LFT ELEVATION: ICD-10-CM

## 2025-04-23 DIAGNOSIS — R32 URINARY INCONTINENCE, UNSPECIFIED TYPE: ICD-10-CM

## 2025-04-23 DIAGNOSIS — Z00.00 WELLNESS EXAMINATION: ICD-10-CM

## 2025-04-23 DIAGNOSIS — N18.31 CHRONIC RENAL FAILURE, STAGE 3A (MULTI): ICD-10-CM

## 2025-04-23 DIAGNOSIS — F10.10 ALCOHOL ABUSE: ICD-10-CM

## 2025-04-23 DIAGNOSIS — E78.00 HYPERCHOLESTEROLEMIA: ICD-10-CM

## 2025-04-23 DIAGNOSIS — F32.1 CURRENT MODERATE EPISODE OF MAJOR DEPRESSIVE DISORDER WITHOUT PRIOR EPISODE (MULTI): ICD-10-CM

## 2025-04-23 DIAGNOSIS — I10 PRIMARY HYPERTENSION: ICD-10-CM

## 2025-04-23 DIAGNOSIS — K21.9 GASTROESOPHAGEAL REFLUX DISEASE WITHOUT ESOPHAGITIS: ICD-10-CM

## 2025-04-23 DIAGNOSIS — Z71.89 CARDIAC RISK COUNSELING: ICD-10-CM

## 2025-04-23 LAB
25(OH)D3+25(OH)D2 SERPL-MCNC: 88 NG/ML (ref 30–100)
ALBUMIN SERPL-MCNC: 4.2 G/DL (ref 3.6–5.1)
ALP SERPL-CCNC: 82 U/L (ref 37–153)
ALT SERPL-CCNC: 70 U/L (ref 6–29)
ANION GAP SERPL CALCULATED.4IONS-SCNC: 8 MMOL/L (CALC) (ref 7–17)
AST SERPL-CCNC: 60 U/L (ref 10–35)
BILIRUB SERPL-MCNC: 0.5 MG/DL (ref 0.2–1.2)
BUN SERPL-MCNC: 16 MG/DL (ref 7–25)
CALCIUM SERPL-MCNC: 9.8 MG/DL (ref 8.6–10.4)
CHLORIDE SERPL-SCNC: 106 MMOL/L (ref 98–110)
CHOLEST SERPL-MCNC: 188 MG/DL
CHOLEST/HDLC SERPL: 2.2 (CALC)
CO2 SERPL-SCNC: 26 MMOL/L (ref 20–32)
CREAT SERPL-MCNC: 0.92 MG/DL (ref 0.6–1)
EGFRCR SERPLBLD CKD-EPI 2021: 64 ML/MIN/1.73M2
ERYTHROCYTE [DISTWIDTH] IN BLOOD BY AUTOMATED COUNT: 13 % (ref 11–15)
GLUCOSE SERPL-MCNC: 113 MG/DL (ref 65–99)
HCT VFR BLD AUTO: 46.8 % (ref 35–45)
HDLC SERPL-MCNC: 84 MG/DL
HGB BLD-MCNC: 15.4 G/DL (ref 11.7–15.5)
LDLC SERPL CALC-MCNC: 87 MG/DL (CALC)
MCH RBC QN AUTO: 33 PG (ref 27–33)
MCHC RBC AUTO-ENTMCNC: 32.9 G/DL (ref 32–36)
MCV RBC AUTO: 100.2 FL (ref 80–100)
NONHDLC SERPL-MCNC: 104 MG/DL (CALC)
PLATELET # BLD AUTO: 176 THOUSAND/UL (ref 140–400)
PMV BLD REES-ECKER: 9.8 FL (ref 7.5–12.5)
POTASSIUM SERPL-SCNC: 4.2 MMOL/L (ref 3.5–5.3)
PROT SERPL-MCNC: 7 G/DL (ref 6.1–8.1)
RBC # BLD AUTO: 4.67 MILLION/UL (ref 3.8–5.1)
SODIUM SERPL-SCNC: 140 MMOL/L (ref 135–146)
TRIGL SERPL-MCNC: 78 MG/DL
TSH SERPL-ACNC: 2.26 MIU/L (ref 0.4–4.5)
WBC # BLD AUTO: 5.4 THOUSAND/UL (ref 3.8–10.8)

## 2025-04-23 PROCEDURE — 1159F MED LIST DOCD IN RCRD: CPT | Performed by: INTERNAL MEDICINE

## 2025-04-23 PROCEDURE — 3078F DIAST BP <80 MM HG: CPT | Performed by: INTERNAL MEDICINE

## 2025-04-23 PROCEDURE — 1170F FXNL STATUS ASSESSED: CPT | Performed by: INTERNAL MEDICINE

## 2025-04-23 PROCEDURE — 99497 ADVNCD CARE PLAN 30 MIN: CPT | Performed by: INTERNAL MEDICINE

## 2025-04-23 PROCEDURE — 1036F TOBACCO NON-USER: CPT | Performed by: INTERNAL MEDICINE

## 2025-04-23 PROCEDURE — G0439 PPPS, SUBSEQ VISIT: HCPCS | Performed by: INTERNAL MEDICINE

## 2025-04-23 PROCEDURE — 99214 OFFICE O/P EST MOD 30 MIN: CPT | Performed by: INTERNAL MEDICINE

## 2025-04-23 PROCEDURE — 1123F ACP DISCUSS/DSCN MKR DOCD: CPT | Performed by: INTERNAL MEDICINE

## 2025-04-23 PROCEDURE — 1160F RVW MEDS BY RX/DR IN RCRD: CPT | Performed by: INTERNAL MEDICINE

## 2025-04-23 PROCEDURE — 1126F AMNT PAIN NOTED NONE PRSNT: CPT | Performed by: INTERNAL MEDICINE

## 2025-04-23 PROCEDURE — 3074F SYST BP LT 130 MM HG: CPT | Performed by: INTERNAL MEDICINE

## 2025-04-23 PROCEDURE — G0446 INTENS BEHAVE THER CARDIO DX: HCPCS | Performed by: INTERNAL MEDICINE

## 2025-04-23 PROCEDURE — 1158F ADVNC CARE PLAN TLK DOCD: CPT | Performed by: INTERNAL MEDICINE

## 2025-04-23 PROCEDURE — 1157F ADVNC CARE PLAN IN RCRD: CPT | Performed by: INTERNAL MEDICINE

## 2025-04-23 RX ORDER — OXYBUTYNIN CHLORIDE 15 MG/1
15 TABLET, EXTENDED RELEASE ORAL DAILY
Qty: 90 TABLET | Refills: 3 | Status: SHIPPED | OUTPATIENT
Start: 2025-04-23

## 2025-04-23 RX ORDER — ATORVASTATIN CALCIUM 10 MG/1
10 TABLET, FILM COATED ORAL DAILY
Qty: 90 TABLET | Refills: 3 | Status: SHIPPED | OUTPATIENT
Start: 2025-04-23

## 2025-04-23 RX ORDER — ESCITALOPRAM OXALATE 10 MG/1
10 TABLET ORAL DAILY
Qty: 90 TABLET | Refills: 3 | Status: SHIPPED | OUTPATIENT
Start: 2025-04-23

## 2025-04-23 SDOH — ECONOMIC STABILITY: INCOME INSECURITY: IN THE LAST 12 MONTHS, WAS THERE A TIME WHEN YOU WERE NOT ABLE TO PAY THE MORTGAGE OR RENT ON TIME?: NO

## 2025-04-23 SDOH — ECONOMIC STABILITY: GENERAL
WHICH OF THE FOLLOWING WOULD YOU LIKE TO GET CONNECTED TO IN ORDER TO RECEIVE A DISCOUNT OR FOR FREE? (CHOOSE ALL THAT APPLY): NO ASSISTANCE NEEDED

## 2025-04-23 SDOH — ECONOMIC STABILITY: FOOD INSECURITY: WITHIN THE PAST 12 MONTHS, YOU WORRIED THAT YOUR FOOD WOULD RUN OUT BEFORE YOU GOT MONEY TO BUY MORE.: NEVER TRUE

## 2025-04-23 SDOH — HEALTH STABILITY: PHYSICAL HEALTH: ON AVERAGE, HOW MANY DAYS PER WEEK DO YOU ENGAGE IN MODERATE TO STRENUOUS EXERCISE (LIKE A BRISK WALK)?: 2 DAYS

## 2025-04-23 SDOH — ECONOMIC STABILITY: FOOD INSECURITY: WITHIN THE PAST 12 MONTHS, THE FOOD YOU BOUGHT JUST DIDN'T LAST AND YOU DIDN'T HAVE MONEY TO GET MORE.: NEVER TRUE

## 2025-04-23 SDOH — HEALTH STABILITY: PHYSICAL HEALTH: ON AVERAGE, HOW MANY MINUTES DO YOU ENGAGE IN EXERCISE AT THIS LEVEL?: 60 MIN

## 2025-04-23 ASSESSMENT — SOCIAL DETERMINANTS OF HEALTH (SDOH)
WITHIN THE LAST YEAR, HAVE YOU BEEN KICKED, HIT, SLAPPED, OR OTHERWISE PHYSICALLY HURT BY YOUR PARTNER OR EX-PARTNER?: NO
HOW OFTEN DO YOU ATTENT MEETINGS OF THE CLUB OR ORGANIZATION YOU BELONG TO?: MORE THAN 4 TIMES PER YEAR
WITHIN THE LAST YEAR, HAVE TO BEEN RAPED OR FORCED TO HAVE ANY KIND OF SEXUAL ACTIVITY BY YOUR PARTNER OR EX-PARTNER?: NO
HOW OFTEN DO YOU ATTEND CHURCH OR RELIGIOUS SERVICES?: NEVER
IN THE PAST 12 MONTHS, HAS THE ELECTRIC, GAS, OIL, OR WATER COMPANY THREATENED TO SHUT OFF SERVICE IN YOUR HOME?: NO
HOW HARD IS IT FOR YOU TO PAY FOR THE VERY BASICS LIKE FOOD, HOUSING, MEDICAL CARE, AND HEATING?: NOT HARD AT ALL
IN A TYPICAL WEEK, HOW MANY TIMES DO YOU TALK ON THE PHONE WITH FAMILY, FRIENDS, OR NEIGHBORS?: MORE THAN THREE TIMES A WEEK
WITHIN THE LAST YEAR, HAVE YOU BEEN AFRAID OF YOUR PARTNER OR EX-PARTNER?: NO
WITHIN THE LAST YEAR, HAVE YOU BEEN HUMILIATED OR EMOTIONALLY ABUSED IN OTHER WAYS BY YOUR PARTNER OR EX-PARTNER?: NO
HOW OFTEN DO YOU GET TOGETHER WITH FRIENDS OR RELATIVES?: THREE TIMES A WEEK
DO YOU BELONG TO ANY CLUBS OR ORGANIZATIONS SUCH AS CHURCH GROUPS UNIONS, FRATERNAL OR ATHLETIC GROUPS, OR SCHOOL GROUPS?: YES

## 2025-04-23 ASSESSMENT — ACTIVITIES OF DAILY LIVING (ADL)
BATHING: INDEPENDENT
FEEDING YOURSELF: INDEPENDENT
BATHING: INDEPENDENT
ASSISTIVE_DEVICE: EYEGLASSES
DRESSING YOURSELF: INDEPENDENT
USING TRANSPORTATION: INDEPENDENT
HEARING - RIGHT EAR: DIFFICULTY WITH NOISE
TOILETING: INDEPENDENT
HEARING - LEFT EAR: DIFFICULTY WITH NOISE
GROCERY SHOPPING: INDEPENDENT
PREPARING MEALS: INDEPENDENT
NEEDS ASSISTANCE WITH FOOD: INDEPENDENT
ADEQUATE_TO_COMPLETE_ADL: YES
DRESSING: INDEPENDENT
STIL DRIVING: YES
PATIENT'S MEMORY ADEQUATE TO SAFELY COMPLETE DAILY ACTIVITIES?: YES
GROOMING: INDEPENDENT
PILL BOX USED: NO
USING TELEPHONE: INDEPENDENT
MANAGING FINANCES: INDEPENDENT
TAKING MEDICATION: INDEPENDENT
DOING HOUSEWORK: INDEPENDENT
WALKS IN HOME: INDEPENDENT
EATING: INDEPENDENT
JUDGMENT_ADEQUATE_SAFELY_COMPLETE_DAILY_ACTIVITIES: YES

## 2025-04-23 ASSESSMENT — ENCOUNTER SYMPTOMS
NAUSEA: 0
SHORTNESS OF BREATH: 0
FREQUENCY: 0
FATIGUE: 0
ARTHRALGIAS: 0
SORE THROAT: 0
DYSURIA: 0
CONSTIPATION: 0
FEVER: 0
LIGHT-HEADEDNESS: 0
VOMITING: 0
TROUBLE SWALLOWING: 0
ABDOMINAL PAIN: 0
COUGH: 0
DIARRHEA: 0
DIZZINESS: 0
PALPITATIONS: 0

## 2025-04-23 ASSESSMENT — PATIENT HEALTH QUESTIONNAIRE - PHQ9
SUM OF ALL RESPONSES TO PHQ9 QUESTIONS 1 AND 2: 0
2. FEELING DOWN, DEPRESSED OR HOPELESS: NOT AT ALL
1. LITTLE INTEREST OR PLEASURE IN DOING THINGS: NOT AT ALL

## 2025-04-23 ASSESSMENT — LIFESTYLE VARIABLES
HOW OFTEN DO YOU HAVE SIX OR MORE DRINKS ON ONE OCCASION: NEVER
HOW MANY STANDARD DRINKS CONTAINING ALCOHOL DO YOU HAVE ON A TYPICAL DAY: 1 OR 2
HOW OFTEN DO YOU HAVE A DRINK CONTAINING ALCOHOL: 4 OR MORE TIMES A WEEK
SKIP TO QUESTIONS 9-10: 1
AUDIT-C TOTAL SCORE: 4

## 2025-04-23 ASSESSMENT — PAIN SCALES - GENERAL: PAINLEVEL_OUTOF10: 0-NO PAIN

## 2025-04-23 NOTE — PROGRESS NOTES
"Subjective   Reason for Visit: Shona Garduno is an 80 y.o. female here for a Medicare Wellness visit.     Past Medical, Surgical, and Family History reviewed and updated in chart.    Reviewed all medications by prescribing practitioner or clinical pharmacist (such as prescriptions, OTCs, herbal therapies and supplements) and documented in the medical record.    Patient is here for annual wellness visit as well as management of her hypertension cholesterol renal disease depression and alcohol use syndrome.  Patient did not actually quantify how much alcohol she is drinking but I got the impression it was about a half a bottle of wine every day which she splits with her .  When asked the exact amount she said it was 2 glasses a day but I suspect it is more.  We did discuss cutting back on the alcohol and recommended only a 4 ounce glass daily.  Cutting back on the alcohol would also help her with weight loss management.    Advance care planning and cardiac risk were also discussed in depth today.          Patient Care Team:  Claudine Perez MD as PCP - General  Claudine Perez MD as PCP - [employer] PCP  Alex Michelle MD as Referring Physician (Gastroenterology)  Gabino Jennings MD as Referring Physician (Ophthalmology)     Review of Systems   Constitutional:  Negative for fatigue and fever.   HENT:  Negative for sore throat and trouble swallowing.    Eyes:  Negative for visual disturbance.   Respiratory:  Negative for cough and shortness of breath.    Cardiovascular:  Negative for chest pain, palpitations and leg swelling.   Gastrointestinal:  Negative for abdominal pain, constipation, diarrhea, nausea and vomiting.   Genitourinary:  Negative for dysuria and frequency.   Musculoskeletal:  Negative for arthralgias.   Skin:  Negative for rash.   Neurological:  Negative for dizziness and light-headedness.       Objective   Vitals:  /73   Pulse 83   Ht 1.613 m (5' 3.5\")   Wt 78.3 kg " (172 lb 9.6 oz)   SpO2 94%   BMI 30.10 kg/m²       Physical Exam  Constitutional:       Appearance: Normal appearance.   HENT:      Head: Normocephalic and atraumatic.      Nose: Nose normal.   Eyes:      Extraocular Movements: Extraocular movements intact.      Pupils: Pupils are equal, round, and reactive to light.   Cardiovascular:      Rate and Rhythm: Normal rate and regular rhythm.   Pulmonary:      Breath sounds: Normal breath sounds.   Abdominal:      General: Abdomen is flat. Bowel sounds are normal.      Palpations: Abdomen is soft.   Musculoskeletal:      Right lower leg: No edema.      Left lower leg: No edema.   Neurological:      Mental Status: She is alert.         Assessment & Plan  Hypercholesterolemia  Liver enzymes are elevated presumably from her alcohol use.  We will continue to monitor these closely as she is also on a atorvastatin.    Orders:    atorvastatin (Lipitor) 10 mg tablet; Take 1 tablet (10 mg) by mouth once daily.    Current moderate episode of major depressive disorder without prior episode (Multi)  Patient's mood and depression are stable on the Lexapro.  She was encouraged to cut back on alcohol use as that could make her depression worse    Orders:    escitalopram (Lexapro) 10 mg tablet; Take 1 tablet (10 mg) by mouth once daily.    Urinary incontinence, unspecified type    Orders:    oxybutynin XL (Ditropan-XL) 15 mg 24 hr tablet; Take 1 tablet (15 mg) by mouth once daily.    Chronic renal failure, stage 3a (Multi)  Patient was encouraged to drink more liquids especially water and avoid anti-inflammatory drugs.  Patient's BUN and creatinine will be continued to be followed         Full code status  CODE STATUS was discussed with the patient today and they wish to be a full code.  Patient understands that this may include CPR, cardioversion, intubation and ventilation if necessary.           Primary hypertension  Hypertension is stable and well-controlled.  She did not need any  refills at this time.         Cardiac risk counseling  Cardiovascular risk discussed and, if needed, lifestyle modifications recommended, including nutritional choices, exercise, and elimination of habits contributing to risk.  We agreed on a plan to reduce the current cardiovascular risk.  Aspirin use/disuse was discussed after reviewing updated guidelines. Greater than 15 min in addition to visit time was spent in discussion and education of patient.  Patient's current 10-year risk or ASCVD risk is at 27.3%.  Patient was encouraged to work on weight loss management, continue taking baby aspirin every day which she is already doing and increasing her exercise.         Age-related osteoporosis without current pathological fracture  Patient's next bone density will be due in August of this year.  She will probably get it sometime later than that perhaps October when her mammogram is also due         LFT elevation  Patient is encouraged to cut back on alcohol and weight loss management for her elevated liver enzymes.         Gastroesophageal reflux disease without esophagitis         Wellness examination  Annual wellness visit completed today.  Patient remains independent in all of her ADLs and IADLs.  All safety measures have been met in the home.  Patient is encouraged to use a pill a weekly pillbox which she is currently not using.  Both advance care planning and cardiac risk were reviewed in depth today with the patient.         ACP (advance care planning)  Patient does have a living will and power of  for healthcare listed with us.  First her  Nain followed by her son Je are her lauren of  for healthcare.  We also went over in depth what she would and would not want done.  Patient would be okay with a ventilator trach or tube feeds short-term.  When given the scenario of being quadriplegic and having mental alertness but being bedbound she said she would prefer to be let go she  cannot imagine herself laying in bed all the time.  Overall she notes that she would want to die with her  at the same time since they are so close.  Overall she would want some specific level of higher functioning as an outcome to any catastrophic medical illness.  She would not mind hiring help to get her in and out of the bathroom or shower if she was limited limited physically but mentally would want to be able to take care of herself for the most part.  Approximately 16 to 17 minutes in addition to the rest of the visit was spent on advance care planning.         Alcohol abuse  Patient remains totally independent and highly functional however she is drinking a substantial amount and I strongly encouraged her to cut back to only 4 ounces of wine every day.

## 2025-04-23 NOTE — ASSESSMENT & PLAN NOTE
Cardiovascular risk discussed and, if needed, lifestyle modifications recommended, including nutritional choices, exercise, and elimination of habits contributing to risk.  We agreed on a plan to reduce the current cardiovascular risk.  Aspirin use/disuse was discussed after reviewing updated guidelines. Greater than 15 min in addition to visit time was spent in discussion and education of patient.  Patient's current 10-year risk or ASCVD risk is at 27.3%.  Patient was encouraged to work on weight loss management, continue taking baby aspirin every day which she is already doing and increasing her exercise.

## 2025-04-23 NOTE — ASSESSMENT & PLAN NOTE
Patient's next bone density will be due in August of this year.  She will probably get it sometime later than that perhaps October when her mammogram is also due

## 2025-04-23 NOTE — ASSESSMENT & PLAN NOTE
Patient remains totally independent and highly functional however she is drinking a substantial amount and I strongly encouraged her to cut back to only 4 ounces of wine every day.

## 2025-04-23 NOTE — ASSESSMENT & PLAN NOTE
Patient's mood and depression are stable on the Lexapro.  She was encouraged to cut back on alcohol use as that could make her depression worse    Orders:    escitalopram (Lexapro) 10 mg tablet; Take 1 tablet (10 mg) by mouth once daily.

## 2025-04-23 NOTE — ASSESSMENT & PLAN NOTE
Patient is encouraged to cut back on alcohol and weight loss management for her elevated liver enzymes.

## 2025-04-23 NOTE — ASSESSMENT & PLAN NOTE
Liver enzymes are elevated presumably from her alcohol use.  We will continue to monitor these closely as she is also on a atorvastatin.    Orders:    atorvastatin (Lipitor) 10 mg tablet; Take 1 tablet (10 mg) by mouth once daily.

## 2025-04-23 NOTE — ASSESSMENT & PLAN NOTE
Patient was encouraged to drink more liquids especially water and avoid anti-inflammatory drugs.  Patient's BUN and creatinine will be continued to be followed

## 2025-04-23 NOTE — ASSESSMENT & PLAN NOTE
Patient does have a living will and power of  for healthcare listed with us.  First her  Nain followed by her son Je are her lauren of  for healthcare.  We also went over in depth what she would and would not want done.  Patient would be okay with a ventilator trach or tube feeds short-term.  When given the scenario of being quadriplegic and having mental alertness but being bedbound she said she would prefer to be let go she cannot imagine herself laying in bed all the time.  Overall she notes that she would want to die with her  at the same time since they are so close.  Overall she would want some specific level of higher functioning as an outcome to any catastrophic medical illness.  She would not mind hiring help to get her in and out of the bathroom or shower if she was limited limited physically but mentally would want to be able to take care of herself for the most part.  Approximately 16 to 17 minutes in addition to the rest of the visit was spent on advance care planning.

## 2025-04-23 NOTE — ASSESSMENT & PLAN NOTE
Annual wellness visit completed today.  Patient remains independent in all of her ADLs and IADLs.  All safety measures have been met in the home.  Patient is encouraged to use a pill a weekly pillbox which she is currently not using.  Both advance care planning and cardiac risk were reviewed in depth today with the patient.

## 2025-08-20 ENCOUNTER — APPOINTMENT (OUTPATIENT)
Dept: PRIMARY CARE | Facility: CLINIC | Age: 80
End: 2025-08-20
Payer: MEDICARE

## 2025-08-20 VITALS
BODY MASS INDEX: 29.37 KG/M2 | DIASTOLIC BLOOD PRESSURE: 73 MMHG | OXYGEN SATURATION: 95 % | HEIGHT: 64 IN | WEIGHT: 172 LBS | HEART RATE: 83 BPM | SYSTOLIC BLOOD PRESSURE: 121 MMHG

## 2025-08-20 DIAGNOSIS — N18.31 CHRONIC RENAL FAILURE, STAGE 3A (MULTI): ICD-10-CM

## 2025-08-20 DIAGNOSIS — Z12.31 VISIT FOR SCREENING MAMMOGRAM: ICD-10-CM

## 2025-08-20 DIAGNOSIS — E78.00 HYPERCHOLESTEROLEMIA: ICD-10-CM

## 2025-08-20 DIAGNOSIS — K21.9 GASTROESOPHAGEAL REFLUX DISEASE WITHOUT ESOPHAGITIS: ICD-10-CM

## 2025-08-20 DIAGNOSIS — F32.1 CURRENT MODERATE EPISODE OF MAJOR DEPRESSIVE DISORDER WITHOUT PRIOR EPISODE (MULTI): ICD-10-CM

## 2025-08-20 DIAGNOSIS — M81.0 AGE-RELATED OSTEOPOROSIS WITHOUT CURRENT PATHOLOGICAL FRACTURE: Primary | ICD-10-CM

## 2025-08-20 DIAGNOSIS — I10 PRIMARY HYPERTENSION: ICD-10-CM

## 2025-08-20 PROCEDURE — 3078F DIAST BP <80 MM HG: CPT | Performed by: INTERNAL MEDICINE

## 2025-08-20 PROCEDURE — 1125F AMNT PAIN NOTED PAIN PRSNT: CPT | Performed by: INTERNAL MEDICINE

## 2025-08-20 PROCEDURE — 99214 OFFICE O/P EST MOD 30 MIN: CPT | Performed by: INTERNAL MEDICINE

## 2025-08-20 PROCEDURE — 1159F MED LIST DOCD IN RCRD: CPT | Performed by: INTERNAL MEDICINE

## 2025-08-20 PROCEDURE — 1160F RVW MEDS BY RX/DR IN RCRD: CPT | Performed by: INTERNAL MEDICINE

## 2025-08-20 PROCEDURE — 3074F SYST BP LT 130 MM HG: CPT | Performed by: INTERNAL MEDICINE

## 2025-08-20 PROCEDURE — G2211 COMPLEX E/M VISIT ADD ON: HCPCS | Performed by: INTERNAL MEDICINE

## 2025-08-20 RX ORDER — VALSARTAN 80 MG/1
80 TABLET ORAL DAILY
Qty: 90 TABLET | Refills: 3 | Status: SHIPPED | OUTPATIENT
Start: 2025-08-20

## 2025-08-20 ASSESSMENT — ENCOUNTER SYMPTOMS
FATIGUE: 0
DIZZINESS: 0
NAUSEA: 0
DIARRHEA: 0
SORE THROAT: 0
COUGH: 0
ARTHRALGIAS: 0
LIGHT-HEADEDNESS: 0
TROUBLE SWALLOWING: 0
FREQUENCY: 0
PALPITATIONS: 0
CONSTIPATION: 0
ABDOMINAL PAIN: 0
DYSURIA: 0
VOMITING: 0
FEVER: 0
SHORTNESS OF BREATH: 0

## 2025-08-20 ASSESSMENT — PATIENT HEALTH QUESTIONNAIRE - PHQ9: 1. LITTLE INTEREST OR PLEASURE IN DOING THINGS: NOT AT ALL

## 2025-08-20 ASSESSMENT — PAIN SCALES - GENERAL: PAINLEVEL_OUTOF10: 2

## 2025-12-19 ENCOUNTER — APPOINTMENT (OUTPATIENT)
Dept: PRIMARY CARE | Facility: CLINIC | Age: 80
End: 2025-12-19
Payer: MEDICARE